# Patient Record
Sex: FEMALE | Race: WHITE | Employment: FULL TIME | URBAN - METROPOLITAN AREA
[De-identification: names, ages, dates, MRNs, and addresses within clinical notes are randomized per-mention and may not be internally consistent; named-entity substitution may affect disease eponyms.]

---

## 2017-12-19 ENCOUNTER — OFFICE VISIT (OUTPATIENT)
Dept: ORTHOPEDIC SURGERY | Age: 52
End: 2017-12-19

## 2017-12-19 VITALS — WEIGHT: 210 LBS | BODY MASS INDEX: 37.21 KG/M2 | RESPIRATION RATE: 16 BRPM | HEIGHT: 63 IN

## 2017-12-19 DIAGNOSIS — S63.501A WRIST SPRAIN, RIGHT, INITIAL ENCOUNTER: Primary | ICD-10-CM

## 2017-12-19 PROCEDURE — 99203 OFFICE O/P NEW LOW 30 MIN: CPT | Performed by: PHYSICIAN ASSISTANT

## 2017-12-19 PROCEDURE — 3014F SCREEN MAMMO DOC REV: CPT | Performed by: PHYSICIAN ASSISTANT

## 2017-12-19 PROCEDURE — G8417 CALC BMI ABV UP PARAM F/U: HCPCS | Performed by: PHYSICIAN ASSISTANT

## 2017-12-19 PROCEDURE — 4004F PT TOBACCO SCREEN RCVD TLK: CPT | Performed by: PHYSICIAN ASSISTANT

## 2017-12-19 PROCEDURE — 3017F COLORECTAL CA SCREEN DOC REV: CPT | Performed by: PHYSICIAN ASSISTANT

## 2017-12-19 PROCEDURE — 73110 X-RAY EXAM OF WRIST: CPT | Performed by: PHYSICIAN ASSISTANT

## 2017-12-19 PROCEDURE — G8427 DOCREV CUR MEDS BY ELIG CLIN: HCPCS | Performed by: PHYSICIAN ASSISTANT

## 2017-12-19 PROCEDURE — G8484 FLU IMMUNIZE NO ADMIN: HCPCS | Performed by: PHYSICIAN ASSISTANT

## 2017-12-19 ASSESSMENT — ENCOUNTER SYMPTOMS
EYES NEGATIVE: 1
GASTROINTESTINAL NEGATIVE: 1
RESPIRATORY NEGATIVE: 1

## 2019-04-10 ENCOUNTER — HOSPITAL ENCOUNTER (OUTPATIENT)
Age: 54
Setting detail: OBSERVATION
Discharge: HOME | End: 2019-04-13
Attending: EMERGENCY MEDICINE | Admitting: SURGERY
Payer: COMMERCIAL

## 2019-04-10 ENCOUNTER — PREP FOR PROCEDURE (OUTPATIENT)
Dept: GENERAL RADIOLOGY | Age: 54
End: 2019-04-10

## 2019-04-10 ENCOUNTER — ANESTHESIA EVENT (OUTPATIENT)
Dept: OPERATING ROOM | Age: 54
End: 2019-04-10
Payer: COMMERCIAL

## 2019-04-10 ENCOUNTER — APPOINTMENT (OUTPATIENT)
Dept: ULTRASOUND IMAGING | Age: 54
End: 2019-04-10
Payer: COMMERCIAL

## 2019-04-10 ENCOUNTER — APPOINTMENT (OUTPATIENT)
Dept: CT IMAGING | Age: 54
End: 2019-04-10
Payer: COMMERCIAL

## 2019-04-10 DIAGNOSIS — K81.9 CHOLECYSTITIS: Primary | ICD-10-CM

## 2019-04-10 DIAGNOSIS — R91.1 PULMONARY NODULE: ICD-10-CM

## 2019-04-10 DIAGNOSIS — K81.0 ACUTE CHOLECYSTITIS: ICD-10-CM

## 2019-04-10 DIAGNOSIS — R11.2 NAUSEA AND VOMITING, INTRACTABILITY OF VOMITING NOT SPECIFIED, UNSPECIFIED VOMITING TYPE: ICD-10-CM

## 2019-04-10 DIAGNOSIS — R10.9 ABDOMINAL PAIN, UNSPECIFIED ABDOMINAL LOCATION: ICD-10-CM

## 2019-04-10 DIAGNOSIS — K59.00 CONSTIPATION, UNSPECIFIED CONSTIPATION TYPE: ICD-10-CM

## 2019-04-10 LAB
ALBUMIN SERPL-MCNC: 3.7 GM/DL (ref 3.4–5)
ALP BLD-CCNC: 51 IU/L (ref 40–129)
ALT SERPL-CCNC: 11 U/L (ref 10–40)
ANION GAP SERPL CALCULATED.3IONS-SCNC: 9 MMOL/L (ref 4–16)
AST SERPL-CCNC: 12 IU/L (ref 15–37)
BACTERIA: NEGATIVE /HPF
BASOPHILS ABSOLUTE: 0.1 K/CU MM
BASOPHILS RELATIVE PERCENT: 0.4 % (ref 0–1)
BILIRUB SERPL-MCNC: 0.6 MG/DL (ref 0–1)
BILIRUBIN URINE: NEGATIVE MG/DL
BLOOD, URINE: ABNORMAL
BUN BLDV-MCNC: 8 MG/DL (ref 6–23)
CALCIUM SERPL-MCNC: 8.6 MG/DL (ref 8.3–10.6)
CHLORIDE BLD-SCNC: 96 MMOL/L (ref 99–110)
CLARITY: CLEAR
CO2: 27 MMOL/L (ref 21–32)
COLOR: YELLOW
CREAT SERPL-MCNC: 0.7 MG/DL (ref 0.6–1.1)
DIFFERENTIAL TYPE: ABNORMAL
EOSINOPHILS ABSOLUTE: 0.1 K/CU MM
EOSINOPHILS RELATIVE PERCENT: 0.4 % (ref 0–3)
GFR AFRICAN AMERICAN: >60 ML/MIN/1.73M2
GFR NON-AFRICAN AMERICAN: >60 ML/MIN/1.73M2
GLUCOSE BLD-MCNC: 107 MG/DL (ref 70–99)
GLUCOSE, URINE: NEGATIVE MG/DL
GONADOTROPIN, CHORIONIC (HCG) QUANT: NORMAL UIU/ML
HCT VFR BLD CALC: 43.5 % (ref 37–47)
HEMOGLOBIN: 13 GM/DL (ref 12.5–16)
IMMATURE NEUTROPHIL %: 0.4 % (ref 0–0.43)
KETONES, URINE: NEGATIVE MG/DL
LEUKOCYTE ESTERASE, URINE: NEGATIVE
LIPASE: 16 IU/L (ref 13–60)
LYMPHOCYTES ABSOLUTE: 1.9 K/CU MM
LYMPHOCYTES RELATIVE PERCENT: 13.8 % (ref 24–44)
MCH RBC QN AUTO: 26.9 PG (ref 27–31)
MCHC RBC AUTO-ENTMCNC: 29.9 % (ref 32–36)
MCV RBC AUTO: 89.9 FL (ref 78–100)
MONOCYTES ABSOLUTE: 1.3 K/CU MM
MONOCYTES RELATIVE PERCENT: 9.4 % (ref 0–4)
MUCUS: ABNORMAL HPF
NITRITE URINE, QUANTITATIVE: NEGATIVE
PDW BLD-RTO: 13 % (ref 11.7–14.9)
PH, URINE: 6 (ref 5–8)
PLATELET # BLD: 176 K/CU MM (ref 140–440)
PMV BLD AUTO: 12.4 FL (ref 7.5–11.1)
POTASSIUM SERPL-SCNC: 3.7 MMOL/L (ref 3.5–5.1)
PROTEIN UA: NEGATIVE MG/DL
RBC # BLD: 4.84 M/CU MM (ref 4.2–5.4)
RBC URINE: 1 /HPF (ref 0–6)
SEGMENTED NEUTROPHILS ABSOLUTE COUNT: 10.3 K/CU MM
SEGMENTED NEUTROPHILS RELATIVE PERCENT: 75.6 % (ref 36–66)
SODIUM BLD-SCNC: 132 MMOL/L (ref 135–145)
SPECIFIC GRAVITY UA: 1.01 (ref 1–1.03)
SQUAMOUS EPITHELIAL: 1 /HPF
TOTAL IMMATURE NEUTOROPHIL: 0.05 K/CU MM
TOTAL PROTEIN: 7 GM/DL (ref 6.4–8.2)
TRICHOMONAS: ABNORMAL /HPF
UROBILINOGEN, URINE: NORMAL MG/DL (ref 0.2–1)
WBC # BLD: 13.6 K/CU MM (ref 4–10.5)
WBC UA: 1 /HPF (ref 0–5)

## 2019-04-10 PROCEDURE — 80053 COMPREHEN METABOLIC PANEL: CPT

## 2019-04-10 PROCEDURE — 96376 TX/PRO/DX INJ SAME DRUG ADON: CPT

## 2019-04-10 PROCEDURE — 6370000000 HC RX 637 (ALT 250 FOR IP): Performed by: SURGERY

## 2019-04-10 PROCEDURE — 74177 CT ABD & PELVIS W/CONTRAST: CPT

## 2019-04-10 PROCEDURE — G0378 HOSPITAL OBSERVATION PER HR: HCPCS

## 2019-04-10 PROCEDURE — 2500000003 HC RX 250 WO HCPCS: Performed by: EMERGENCY MEDICINE

## 2019-04-10 PROCEDURE — 85025 COMPLETE CBC W/AUTO DIFF WBC: CPT

## 2019-04-10 PROCEDURE — 6360000002 HC RX W HCPCS: Performed by: EMERGENCY MEDICINE

## 2019-04-10 PROCEDURE — 84702 CHORIONIC GONADOTROPIN TEST: CPT

## 2019-04-10 PROCEDURE — 2580000003 HC RX 258: Performed by: EMERGENCY MEDICINE

## 2019-04-10 PROCEDURE — 96366 THER/PROPH/DIAG IV INF ADDON: CPT

## 2019-04-10 PROCEDURE — 99285 EMERGENCY DEPT VISIT HI MDM: CPT

## 2019-04-10 PROCEDURE — 81001 URINALYSIS AUTO W/SCOPE: CPT

## 2019-04-10 PROCEDURE — 96375 TX/PRO/DX INJ NEW DRUG ADDON: CPT

## 2019-04-10 PROCEDURE — 93010 ELECTROCARDIOGRAM REPORT: CPT | Performed by: INTERNAL MEDICINE

## 2019-04-10 PROCEDURE — 99219 PR INITIAL OBSERVATION CARE/DAY 50 MINUTES: CPT | Performed by: SURGERY

## 2019-04-10 PROCEDURE — 96372 THER/PROPH/DIAG INJ SC/IM: CPT

## 2019-04-10 PROCEDURE — 96365 THER/PROPH/DIAG IV INF INIT: CPT

## 2019-04-10 PROCEDURE — 93005 ELECTROCARDIOGRAM TRACING: CPT | Performed by: EMERGENCY MEDICINE

## 2019-04-10 PROCEDURE — 76705 ECHO EXAM OF ABDOMEN: CPT

## 2019-04-10 PROCEDURE — 96361 HYDRATE IV INFUSION ADD-ON: CPT

## 2019-04-10 PROCEDURE — 83690 ASSAY OF LIPASE: CPT

## 2019-04-10 PROCEDURE — 6360000002 HC RX W HCPCS: Performed by: SURGERY

## 2019-04-10 PROCEDURE — 6360000004 HC RX CONTRAST MEDICATION: Performed by: EMERGENCY MEDICINE

## 2019-04-10 PROCEDURE — 2500000003 HC RX 250 WO HCPCS: Performed by: SURGERY

## 2019-04-10 PROCEDURE — 2580000003 HC RX 258: Performed by: SURGERY

## 2019-04-10 RX ORDER — HYDROMORPHONE HCL 110MG/55ML
0.5 PATIENT CONTROLLED ANALGESIA SYRINGE INTRAVENOUS
Status: DISCONTINUED | OUTPATIENT
Start: 2019-04-10 | End: 2019-04-11

## 2019-04-10 RX ORDER — ONDANSETRON 2 MG/ML
4 INJECTION INTRAMUSCULAR; INTRAVENOUS EVERY 6 HOURS PRN
Status: DISCONTINUED | OUTPATIENT
Start: 2019-04-10 | End: 2019-04-13 | Stop reason: HOSPADM

## 2019-04-10 RX ORDER — MORPHINE SULFATE 4 MG/ML
4 INJECTION, SOLUTION INTRAMUSCULAR; INTRAVENOUS EVERY 30 MIN PRN
Status: DISCONTINUED | OUTPATIENT
Start: 2019-04-10 | End: 2019-04-10

## 2019-04-10 RX ORDER — HYDROCODONE BITARTRATE AND ACETAMINOPHEN 5; 325 MG/1; MG/1
2 TABLET ORAL EVERY 4 HOURS PRN
Status: DISCONTINUED | OUTPATIENT
Start: 2019-04-10 | End: 2019-04-13 | Stop reason: HOSPADM

## 2019-04-10 RX ORDER — SODIUM CHLORIDE 0.9 % (FLUSH) 0.9 %
10 SYRINGE (ML) INJECTION EVERY 12 HOURS SCHEDULED
Status: DISCONTINUED | OUTPATIENT
Start: 2019-04-10 | End: 2019-04-13 | Stop reason: HOSPADM

## 2019-04-10 RX ORDER — SODIUM CHLORIDE 0.9 % (FLUSH) 0.9 %
10 SYRINGE (ML) INJECTION 2 TIMES DAILY
Status: DISCONTINUED | OUTPATIENT
Start: 2019-04-10 | End: 2019-04-13 | Stop reason: HOSPADM

## 2019-04-10 RX ORDER — ASPIRIN 325 MG
325 TABLET ORAL DAILY
COMMUNITY

## 2019-04-10 RX ORDER — ONDANSETRON 2 MG/ML
4 INJECTION INTRAMUSCULAR; INTRAVENOUS EVERY 30 MIN PRN
Status: DISCONTINUED | OUTPATIENT
Start: 2019-04-10 | End: 2019-04-10 | Stop reason: HOSPADM

## 2019-04-10 RX ORDER — SODIUM CHLORIDE 0.9 % (FLUSH) 0.9 %
10 SYRINGE (ML) INJECTION PRN
Status: CANCELLED | OUTPATIENT
Start: 2019-04-10

## 2019-04-10 RX ORDER — DICYCLOMINE HYDROCHLORIDE 10 MG/ML
20 INJECTION INTRAMUSCULAR ONCE
Status: COMPLETED | OUTPATIENT
Start: 2019-04-10 | End: 2019-04-10

## 2019-04-10 RX ORDER — SODIUM CHLORIDE 0.9 % (FLUSH) 0.9 %
10 SYRINGE (ML) INJECTION PRN
Status: DISCONTINUED | OUTPATIENT
Start: 2019-04-10 | End: 2019-04-13 | Stop reason: HOSPADM

## 2019-04-10 RX ORDER — SODIUM CHLORIDE, SODIUM LACTATE, POTASSIUM CHLORIDE, CALCIUM CHLORIDE 600; 310; 30; 20 MG/100ML; MG/100ML; MG/100ML; MG/100ML
INJECTION, SOLUTION INTRAVENOUS CONTINUOUS
Status: DISCONTINUED | OUTPATIENT
Start: 2019-04-10 | End: 2019-04-13 | Stop reason: HOSPADM

## 2019-04-10 RX ORDER — HYDROCODONE BITARTRATE AND ACETAMINOPHEN 5; 325 MG/1; MG/1
1 TABLET ORAL EVERY 4 HOURS PRN
Status: DISCONTINUED | OUTPATIENT
Start: 2019-04-10 | End: 2019-04-13 | Stop reason: HOSPADM

## 2019-04-10 RX ORDER — SODIUM CHLORIDE 0.9 % (FLUSH) 0.9 %
10 SYRINGE (ML) INJECTION EVERY 12 HOURS SCHEDULED
Status: CANCELLED | OUTPATIENT
Start: 2019-04-10

## 2019-04-10 RX ORDER — HYDROMORPHONE HCL 110MG/55ML
0.25 PATIENT CONTROLLED ANALGESIA SYRINGE INTRAVENOUS
Status: DISCONTINUED | OUTPATIENT
Start: 2019-04-10 | End: 2019-04-11

## 2019-04-10 RX ORDER — 0.9 % SODIUM CHLORIDE 0.9 %
1000 INTRAVENOUS SOLUTION INTRAVENOUS ONCE
Status: COMPLETED | OUTPATIENT
Start: 2019-04-10 | End: 2019-04-10

## 2019-04-10 RX ORDER — ACETAMINOPHEN 325 MG/1
650 TABLET ORAL EVERY 4 HOURS PRN
Status: DISCONTINUED | OUTPATIENT
Start: 2019-04-10 | End: 2019-04-13 | Stop reason: HOSPADM

## 2019-04-10 RX ADMIN — HYDROCODONE BITARTRATE AND ACETAMINOPHEN 2 TABLET: 5; 325 TABLET ORAL at 18:43

## 2019-04-10 RX ADMIN — FAMOTIDINE 20 MG: 10 INJECTION, SOLUTION INTRAVENOUS at 14:30

## 2019-04-10 RX ADMIN — MORPHINE SULFATE 4 MG: 4 INJECTION INTRAVENOUS at 14:30

## 2019-04-10 RX ADMIN — HYDROMORPHONE HYDROCHLORIDE 0.5 MG: 2 INJECTION, SOLUTION INTRAMUSCULAR; INTRAVENOUS; SUBCUTANEOUS at 21:13

## 2019-04-10 RX ADMIN — TAZOBACTAM SODIUM AND PIPERACILLIN SODIUM 3.38 G: 375; 3 INJECTION, SOLUTION INTRAVENOUS at 23:38

## 2019-04-10 RX ADMIN — ONDANSETRON 4 MG: 2 INJECTION INTRAMUSCULAR; INTRAVENOUS at 14:30

## 2019-04-10 RX ADMIN — DICYCLOMINE HYDROCHLORIDE 20 MG: 20 INJECTION, SOLUTION INTRAMUSCULAR at 14:30

## 2019-04-10 RX ADMIN — SODIUM CHLORIDE, PRESERVATIVE FREE 10 ML: 5 INJECTION INTRAVENOUS at 17:07

## 2019-04-10 RX ADMIN — IOPAMIDOL 80 ML: 755 INJECTION, SOLUTION INTRAVENOUS at 17:06

## 2019-04-10 RX ADMIN — SODIUM CHLORIDE 1000 ML: 9 INJECTION, SOLUTION INTRAVENOUS at 14:30

## 2019-04-10 RX ADMIN — TAZOBACTAM SODIUM AND PIPERACILLIN SODIUM 3.38 G: 375; 3 INJECTION, SOLUTION INTRAVENOUS at 16:37

## 2019-04-10 RX ADMIN — SODIUM CHLORIDE, POTASSIUM CHLORIDE, SODIUM LACTATE AND CALCIUM CHLORIDE: 600; 310; 30; 20 INJECTION, SOLUTION INTRAVENOUS at 18:43

## 2019-04-10 RX ADMIN — HYDROCODONE BITARTRATE AND ACETAMINOPHEN 2 TABLET: 5; 325 TABLET ORAL at 23:46

## 2019-04-10 RX ADMIN — ONDANSETRON 4 MG: 2 INJECTION INTRAMUSCULAR; INTRAVENOUS at 22:10

## 2019-04-10 ASSESSMENT — ENCOUNTER SYMPTOMS
EYE REDNESS: 0
DIARRHEA: 0
EYE DISCHARGE: 0
CONSTIPATION: 1
ALLERGIC/IMMUNOLOGIC NEGATIVE: 1
RESPIRATORY NEGATIVE: 1
VOMITING: 0
NAUSEA: 1
SHORTNESS OF BREATH: 1
ABDOMINAL PAIN: 1
VOMITING: 1
EYES NEGATIVE: 1
ABDOMINAL DISTENTION: 0
CHEST TIGHTNESS: 0
SORE THROAT: 0
COLOR CHANGE: 0

## 2019-04-10 ASSESSMENT — PAIN DESCRIPTION - DESCRIPTORS
DESCRIPTORS: SHARP
DESCRIPTORS: SHOOTING
DESCRIPTORS: SHARP
DESCRIPTORS: SHARP

## 2019-04-10 ASSESSMENT — PAIN DESCRIPTION - ORIENTATION
ORIENTATION: RIGHT;MID
ORIENTATION: RIGHT;UPPER
ORIENTATION: RIGHT;MID
ORIENTATION: RIGHT;UPPER

## 2019-04-10 ASSESSMENT — PAIN DESCRIPTION - PAIN TYPE
TYPE: ACUTE PAIN

## 2019-04-10 ASSESSMENT — PAIN SCALES - GENERAL
PAINLEVEL_OUTOF10: 8
PAINLEVEL_OUTOF10: 10
PAINLEVEL_OUTOF10: 4
PAINLEVEL_OUTOF10: 7
PAINLEVEL_OUTOF10: 10
PAINLEVEL_OUTOF10: 6
PAINLEVEL_OUTOF10: 6

## 2019-04-10 ASSESSMENT — PAIN DESCRIPTION - LOCATION
LOCATION: ABDOMEN

## 2019-04-10 NOTE — H&P
GENERAL SURGERY INPATIENT HISTORY & PHYSICAL NOTE  [facility]    PATIENT: Dolores Lynne 1965, 46 y.o., female    MRN: [id number]    Physician: Yasmani Giang MD    Date: 4/10/19    Reason for Evaluation:  cholecystitis   Chief Complaint   Patient presents with    Abdominal Pain       Requesting Physician:  ED Consult - Dr. Betsey Crowe:     Chief Complaint   Patient presents with    Abdominal Pain       History Obtained From:  patient, electronic medical record    HISTORY OF PRESENT ILLNESS:      Tre Thibodeaux is a 46 y.o. female presenting with epigastric and right upper quadrant pain. It started on Sunday, but today became so severe that she called EMS and was brought to the ED. She has not had any episodes like this before. The pain does not radiate. She hasn't noticed an association with food. Denies aggravating or alleviating factors. She has had associated nausea, but no vomiting. She has had changes in the caliber and color of her stool over the last month or so - it has decreased in caliber and she had a few days about a month ago where it was black, but currently is more brown or yellow in color. She has been belching a lot, passing some flatus. Of Note: She had a heart cath in 2007 and had a femoral clot which embolized to her leg for which she was treated with tPA. She usually takes aspirin daily, but hasn't taken it in about a week.       Past Medical History:    Past Medical History:   Diagnosis Date    CAD (coronary artery disease)     Hx of blood clots 2007    clot to leg after a heart cath, treated with tPA       Past SurgicalHistory:    Past Surgical History:   Procedure Laterality Date    CARDIAC CATHETERIZATION  2007       CurrentMedications:   Current Facility-Administered Medications   Medication Dose Route Frequency Provider Last Rate Last Dose    morphine sulfate (PF) injection 4 mg  4 mg Intravenous Q30 Min PRN Nicolasa Colin DO place, and time. Skin: Skin is warm and dry. No rash noted. She is not diaphoretic. Psychiatric: She has a normal mood and affect. Her behavior is normal.       DATA:    Lab Results   Component Value Date    WBC 13.6 (H) 04/10/2019    HGB 13.0 04/10/2019    HCT 43.5 04/10/2019     04/10/2019     (L) 04/10/2019    K 3.7 04/10/2019    CL 96 (L) 04/10/2019    CO2 27 04/10/2019    BUN 8 04/10/2019    CREATININE 0.7 04/10/2019    GLUCOSE 107 (H) 04/10/2019    CALCIUM 8.6 04/10/2019    PROT 7.0 04/10/2019    BILITOT 0.6 04/10/2019    AST 12 (L) 04/10/2019    ALT 11 04/10/2019    ALKPHOS 51 04/10/2019    LIPASE 16 04/10/2019    INR 0.94 11/01/2010       Imaging:   Us Abdomen Limited    Result Date: 4/10/2019  EXAMINATION: RIGHT UPPER QUADRANT ULTRASOUND 4/10/2019 2:42 pm COMPARISON: 05/29/2000 HISTORY: ORDERING SYSTEM PROVIDED HISTORY: RUQ pain TECHNOLOGIST PROVIDED HISTORY: Reason for exam:->RUQ pain Ordering Physician Provided Reason for Exam: RUQ pain Acuity: Acute Type of Exam: Initial Additional signs and symptoms: nausea, constipation Relevant Medical/Surgical History: nk FINDINGS: LIVER:  Septated cyst measuring 5.3 cm in the dome of the liver. There also is a 2.2 cm cyst which is septated in the the right lobe of the liver. No solid masses. BILIARY SYSTEM:  Cholelithiasis. A stone is located in the neck of the gallbladder. The other stone is seen in the fundus. Mild gallbladder wall thickening. Positive sonographic Kenny's sign. Common bile duct is within normal limits measuring 6 mm. RIGHT KIDNEY: The right kidney is grossly unremarkable without evidence of hydronephrosis. PANCREAS:  Visualized portions of the pancreas are unremarkable. OTHER: No evidence of right upper quadrant ascites. 1. Cholelithiasis with mild gallbladder wall thickening, a stone in the gallbladder neck, and a positive sonographic Kenny's sign suggesting acute cholecystitis 2.  Septated cysts in the liver appear benign       Pertinent laboratory and imaging studies were personally reviewed if available. IMPRESSION:    Jacob Laura is a 46 y.o. female with abdominal pain and findings consistent with acute cholecystitis    1. Cholecystitis    2. Abdominal pain, unspecified abdominal location    3. Nausea and vomiting, intractability of vomiting not specified, unspecified vomiting type    4. Constipation, unspecified constipation type      There are no active problems to display for this patient. PLAN:  · Will admit for observation  · NPO after midnight  · Will start zosyn  · Check labs in AM  · Tentatively planning on OR tomorrow  Planned procedure: laparoscopic cholecystectomy  The risks, benefits, potential complications and possible alternatives of the procedure were discussed in detail, including complications of but not limited to infection, bleeding, anesthesia-related complications, death, bile duct injury or leak, or need for additional interventions. All questions were answered. The patient wished to proceed and consent was documented in the medical record. PAT anticipated: no (inpatient)  Informed consent: obtained  Anticipated status: 23 hour observation  Anticipated location:  [facility]   Will plan on outpatient follow up with GI for her change in stool caliber and color.     Electronically signed by Jen Piper MD, 4/10/2019, 4:49 PM

## 2019-04-10 NOTE — ED TRIAGE NOTES
Patient presents to ER with c/o right upper quadrant abdominal pain since Sunday. Patient states today pain became worse.

## 2019-04-10 NOTE — ED PROVIDER NOTES
[address] [facility]      TRIAGE CHIEF COMPLAINT:   Abdominal Pain      [place]:  Marycarmen Huizar is a 46 y.o. female that presents complaining of right upper quadrant epigastric pain nausea vomiting since Sunday. Constant upper quadrant pain epigastric pain. No history of abdominal surgeries denies any fevers chest pain short of breath cough she has urinary frequency otherwise denies complaints she has had constipation passing slight flatus. No other questions or concerns. REVIEW OF SYSTEMS:  At least 10 systems reviewed and otherwise acutely negative except as in the [address] Review of Systems   Constitutional: Negative. HENT: Negative. Eyes: Negative. Respiratory: Negative. Cardiovascular: Negative. Gastrointestinal: Positive for abdominal pain, constipation, nausea and vomiting. Endocrine: Negative. Genitourinary: Positive for frequency. Musculoskeletal: Negative. Skin: Negative. Allergic/Immunologic: Negative. Neurological: Negative. Hematological: Negative. Psychiatric/Behavioral: Negative. All other systems reviewed and are negative. Past Medical History:   Diagnosis Date    CAD (coronary artery disease)     Hx of blood clots 2007    clot to leg after a heart cath, treated with tPA     Past Surgical History:   Procedure Laterality Date    CARDIAC CATHETERIZATION  2007     History reviewed. No pertinent family history.   Social History     Socioeconomic History    Marital status:      Spouse name: Not on file    Number of children: Not on file    Years of education: Not on file    Highest education level: Not on file   Occupational History    Not on file   Social Needs    Financial resource strain: Not on file    Food insecurity:     Worry: Not on file     Inability: Not on file    Transportation needs:     Medical: Not on file     Non-medical: Not on file   Tobacco Use    Smoking status: Current Every Day Smoker     Packs/day: 0.50     Types: Cigarettes    Smokeless tobacco: Never Used   Substance and Sexual Activity    Alcohol use: No    Drug use: No    Sexual activity: Not on file   Lifestyle    Physical activity:     Days per week: Not on file     Minutes per session: Not on file    Stress: Not on file   Relationships    Social connections:     Talks on phone: Not on file     Gets together: Not on file     Attends Oriental orthodox service: Not on file     Active member of club or organization: Not on file     Attends meetings of clubs or organizations: Not on file     Relationship status: Not on file    Intimate partner violence:     Fear of current or ex partner: Not on file     Emotionally abused: Not on file     Physically abused: Not on file     Forced sexual activity: Not on file   Other Topics Concern    Not on file   Social History Narrative    Not on file     Current Facility-Administered Medications   Medication Dose Route Frequency Provider Last Rate Last Dose    sodium chloride flush 0.9 % injection 10 mL  10 mL Intravenous BID Samuel Hendrickson MD   10 mL at 04/10/19 1707    sodium chloride flush 0.9 % injection 10 mL  10 mL Intravenous 2 times per day Samuel Hendrickson MD        sodium chloride flush 0.9 % injection 10 mL  10 mL Intravenous PRN Samuel Hendrickson MD        acetaminophen (TYLENOL) tablet 650 mg  650 mg Oral Q4H PRN Samuel Hendrickson MD        lactated ringers infusion   Intravenous Continuous Samuel Hendrickson MD        HYDROcodone-acetaminophen (NORCO) 5-325 MG per tablet 1 tablet  1 tablet Oral Q4H PRN Samuel Hendrickson MD        Or    HYDROcodone-acetaminophen (NORCO) 5-325 MG per tablet 2 tablet  2 tablet Oral Q4H PRN Samuel Hendrickson MD        HYDROmorphone (DILAUDID) injection 0.25 mg  0.25 mg Intravenous Q3H PRN Samuel Hendrickson MD        Or   [facility] HYDROmorphone (DILAUDID) injection 0.5 mg  0.5 mg Intravenous Q3H PRN Samuel Hendrickson MD        ondansetron [facility]) injection 4 mg  4 mg Intravenous Q6H PRN Max Graham MD        [START ON 4/11/2019] piperacillin-tazobactam (ZOSYN) 3.375 g in dextrose 50 mL IVPB extended infusion (premix)  3.375 g Intravenous Q8H Max Graham MD          No Known Allergies  Current Facility-Administered Medications   Medication Dose Route Frequency Provider Last Rate Last Dose    sodium chloride flush 0.9 % injection 10 mL  10 mL Intravenous BID Max Graham MD   10 mL at 04/10/19 1707    sodium chloride flush 0.9 % injection 10 mL  10 mL Intravenous 2 times per day Max Graham MD        sodium chloride flush 0.9 % injection 10 mL  10 mL Intravenous PRN Max Graham MD        acetaminophen (TYLENOL) tablet 650 mg  650 mg Oral Q4H PRN Max Graham MD        lactated ringers infusion   Intravenous Continuous Max Graham MD        HYDROcodone-acetaminophen [facility]) 5-325 MG per tablet 1 tablet  1 tablet Oral Q4H PRN Max Graham MD        Or   [facility] HYDROcodone-acetaminophen [facility]) 5-325 MG per tablet 2 tablet  2 tablet Oral Q4H PRN Max Graham MD        HYDROmorphone (DILAUDID) injection 0.25 mg  0.25 mg Intravenous Q3H PRN Max Graham MD        Or   [facility] HYDROmorphone (DILAUDID) injection 0.5 mg  0.5 mg Intravenous Q3H PRN Max Graham MD        ondansetron [facility]) injection 4 mg  4 mg Intravenous Q6H PRN Max Graham MD        [START ON 4/11/2019] piperacillin-tazobactam (ZOSYN) 3.375 g in dextrose 50 mL IVPB extended infusion (premix)  3.375 g Intravenous Zamzam Souza MD           Nursing Notes Reviewed    VITAL SIGNS:  ED Triage Vitals [04/10/19 1413]   Enc Vitals Group      BP (!) 164/105      Pulse 96      Resp 18      Temp 98.4 °F (36.9 °C)      Temp Source Oral      SpO2 99 %      Weight 200 lb (90.7 kg)      Height 5' 3\" (1.6 m)      Head Circumference       Peak Flow       Pain Score       Pain Loc       Pain Edu? Excl. in [address]? PHYSICAL EXAM:  Physical Exam   Constitutional: She is oriented to person, place, and time. She appears well-developed and well-nourished. She is active and cooperative. Non-toxic appearance. She does not have a sickly appearance. She appears ill. No distress. HENT:   Head: Normocephalic and atraumatic. Right Ear: External ear normal.   Left Ear: External ear normal.   Mouth/Throat: Oropharynx is clear and moist.   Eyes: Pupils are equal, round, and reactive to light. Conjunctivae and EOM are normal. Right eye exhibits no discharge. Left eye exhibits no discharge. No scleral icterus. Neck: Normal range of motion. No JVD present. Cardiovascular: Normal rate, regular rhythm, normal heart sounds and intact distal pulses. Exam reveals no gallop and no friction rub. No murmur heard. Pulmonary/Chest: Effort normal and breath sounds normal. No stridor. No respiratory distress. She has no wheezes. She has no rales. Abdominal: Soft. Bowel sounds are normal. She exhibits no distension and no mass. There is tenderness in the right upper quadrant and epigastric area. There is positive Kenny's sign. There is no rigidity, no rebound, no guarding and no tenderness at McBurney's point. Musculoskeletal: Normal range of motion. She exhibits no edema, tenderness or deformity. Neurological: She is alert and oriented to person, place, and time. She has normal strength. She is not disoriented. She displays no atrophy and no tremor. No cranial nerve deficit or sensory deficit. She exhibits normal muscle tone. She displays no seizure activity. Coordination normal. GCS eye subscore is 4. GCS verbal subscore is 5. GCS motor subscore is 6. Skin: Skin is warm. No rash noted. She is not diaphoretic. No erythema. No pallor. Psychiatric: She has a normal mood and affect. Her behavior is normal. Judgment and thought content normal.   Nursing note and vitals reviewed.       I have reviewed andinterpreted all of the currently available lab results from this visit (if applicable):    Results for orders placed or performed during the hospital encounter of 04/10/19   CBC Auto Differential   Result Value Ref Range    WBC 13.6 (H) 4.0 - 10.5 K/CU MM    RBC 4.84 4.2 - 5.4 M/CU MM    Hemoglobin 13.0 12.5 - 16.0 GM/DL    Hematocrit 43.5 37 - 47 %    MCV 89.9 78 - 100 FL    MCH 26.9 (L) 27 - 31 PG    MCHC 29.9 (L) 32.0 - 36.0 %    RDW 13.0 11.7 - 14.9 %    Platelets 175 085 - 175 K/CU MM    MPV 12.4 (H) 7.5 - 11.1 FL    Immature Neutrophil % 0.4 0 - 0.43 %    Segs Relative 75.6 (H) 36 - 66 %    Eosinophils % 0.4 0 - 3 %    Basophils % 0.4 0 - 1 %    Lymphocytes % 13.8 (L) 24 - 44 %    Monocytes % 9.4 (H) 0 - 4 %    Total Immature Neutrophil 0.05 K/CU MM    Segs Absolute 10.3 K/CU MM    Eosinophils # 0.1 K/CU MM    Basophils # 0.1 K/CU MM    Lymphocytes # 1.9 K/CU MM    Monocytes # 1.3 K/CU MM    Differential Type AUTOMATED DIFFERENTIAL    CMP   Result Value Ref Range    Sodium 132 (L) 135 - 145 MMOL/L    Potassium 3.7 3.5 - 5.1 MMOL/L    Chloride 96 (L) 99 - 110 mMol/L    CO2 27 21 - 32 MMOL/L    BUN 8 6 - 23 MG/DL    CREATININE 0.7 0.6 - 1.1 MG/DL    Glucose 107 (H) 70 - 99 MG/DL    Calcium 8.6 8.3 - 10.6 MG/DL    Alb 3.7 3.4 - 5.0 GM/DL    Total Protein 7.0 6.4 - 8.2 GM/DL    Total Bilirubin 0.6 0.0 - 1.0 MG/DL    ALT 11 10 - 40 U/L    AST 12 (L) 15 - 37 IU/L    Alkaline Phosphatase 51 40 - 129 IU/L    GFR Non-African American >60 >60 mL/min/1.73m2    GFR African American >60 >60 mL/min/1.73m2    Anion Gap 9 4 - 16   Lipase   Result Value Ref Range    Lipase 16 13 - 60 IU/L   Urinalysis   Result Value Ref Range    Color, UA YELLOW YELLOW    Clarity, UA CLEAR CLEAR    Glucose, Urine NEGATIVE NEGATIVE MG/DL    Bilirubin Urine NEGATIVE NEGATIVE MG/DL    Ketones, Urine NEGATIVE NEGATIVE MG/DL    Specific Gravity, UA 1.012 1.001 - 1.035    Blood, Urine SMALL (A) NEGATIVE    pH, Urine 6.0 5.0 - 8.0    Protein, UA NEGATIVE NEGATIVE MG/DL    Urobilinogen, Urine NORMAL 0.2 - 1.0 MG/DL    Nitrite Urine, Quantitative NEGATIVE NEGATIVE    Leukocyte Esterase, Urine NEGATIVE NEGATIVE    RBC, UA 1 0 - 6 /HPF    WBC, UA 1 0 - 5 /HPF    Bacteria, UA NEGATIVE NEGATIVE /HPF    Squam Epithel, UA 1 /HPF    Mucus, UA RARE (A) NEGATIVE HPF    Trichomonas, UA NONE SEEN NONE SEEN /HPF   HCG, Quantitative, Pregnancy   Result Value Ref Range    hCG Quant 1.9                                          UIU/ML    hCG Quant EXPECTED VALUES IN PREGNANCY UIU/ML    hCG Quant    7-50               0.2-1 WEEK UIU/ML    hCG Quant                 1-2 WEEKS UIU/ML    hCG Quant    100-5000           2-3 WEEKS UIU/ML    hCG Quant    500-10,000         3-4 WEEKS UIU/ML    hCG Quant    1000-50,000        4-5 WEEKS UIU/ML    hCG Quant    10,000-100,000     5-6 WEEKS UIU/ML    hCG Quant    15,000-200,000     6-8 WEEKS UIU/ML    hCG Quant    10,000-100,000     2-3 MONTHS UIU/ML   EKG 12 Lead   Result Value Ref Range    Ventricular Rate 86 BPM    Atrial Rate 86 BPM    P-R Interval 182 ms    QRS Duration 88 ms    Q-T Interval 366 ms    QTc Calculation (Bazett) 437 ms    P Axis 34 degrees    R Axis -9 degrees    T Axis 33 degrees    Diagnosis       Normal sinus rhythm  Normal ECG  No previous ECGs available  Confirmed by [facility] Tamar HERNÁNDEZ ([id number]) on 4/10/2019 5:47:07 PM          Radiographs (if obtained):  [] The following radiograph was interpreted by myself in the absence of a radiologist:  [x] Radiologist's Report Reviewed:    RUQ US, CT Abd Pelv    EKG (if obtained): (All EKG's are interpreted by myself in the absence of a cardiologist)    12 lead EKG per my interpretation:  Normal Sinus Rhythm 86  Axis is   Normal  QTc is  437  There is no specific T wave changes appreciated. There is no specific ST wave changes appreciated. Prior EKG to compare with was not available     Total critical care time today provided was at least 30 minutes.  This excludes seperately billable procedure. Critical care time provided for reviewing labs, images consulting surgery giving antibiotics,  that required close evaluation and/or intervention with concern for patient decompensation. MDM:    Patient here for quadrant epigastric pain nausea vomiting constipation since Sunday. She appears in no distress she is in some pain she had a Kenny's sign will give pain medicine nausea medicine IV fluids we'll check labs and imaging including ultrasound EKG. ultrasound shows cholecystitis has no white count I did talk to general surgery admitted for observation given antibiotics pain medicine nausea medicine. Patient feels better repeat evaluation.     CLINICAL IMPRESSION:  Final diagnoses:   Abdominal pain, unspecified abdominal location   Nausea and vomiting, intractability of vomiting not specified, unspecified vomiting type   Constipation, unspecified constipation type   Cholecystitis   Pulmonary nodule       (Please note that portions of this note may have been completed with a voice recognition program. Efforts were made to edit the dictations but occasionally words aremis-transcribed.)    DISPOSITION REFERRAL (if applicable):  Alicia Wilcox MD  [address] [facility] [address] [id number] [phone]            DISPOSITION MEDICATIONS (if applicable):  Current Discharge Medication List             Vinicius Yuan, [facility],   04/10/19 3916

## 2019-04-10 NOTE — ED NOTES
Report called to [address] at this time.  Bed 4122 dirty per Elsa ed [address] [facility] FRANCISCO Campbell  04/10/19 2934

## 2019-04-10 NOTE — ED NOTES
Bed: H-02  Expected date:   Expected time:   Means of arrival:   Comments:  Mckayla Anthony RN  04/10/19 3089

## 2019-04-10 NOTE — ED NOTES
Bed: ED-14  Expected date:   Expected time:   Means of arrival:   Comments:  [address], RN  04/10/19 9713

## 2019-04-11 ENCOUNTER — ANESTHESIA (OUTPATIENT)
Dept: OPERATING ROOM | Age: 54
End: 2019-04-11
Payer: COMMERCIAL

## 2019-04-11 VITALS
OXYGEN SATURATION: 98 % | TEMPERATURE: 97.8 F | DIASTOLIC BLOOD PRESSURE: 95 MMHG | SYSTOLIC BLOOD PRESSURE: 177 MMHG | RESPIRATION RATE: 12 BRPM

## 2019-04-11 LAB
ANION GAP SERPL CALCULATED.3IONS-SCNC: 8 MMOL/L (ref 4–16)
BUN BLDV-MCNC: 8 MG/DL (ref 6–23)
CALCIUM SERPL-MCNC: 8.5 MG/DL (ref 8.3–10.6)
CHLORIDE BLD-SCNC: 99 MMOL/L (ref 99–110)
CO2: 31 MMOL/L (ref 21–32)
CREAT SERPL-MCNC: 0.8 MG/DL (ref 0.6–1.1)
GFR AFRICAN AMERICAN: >60 ML/MIN/1.73M2
GFR NON-AFRICAN AMERICAN: >60 ML/MIN/1.73M2
GLUCOSE BLD-MCNC: 99 MG/DL (ref 70–99)
HCT VFR BLD CALC: 41.5 % (ref 37–47)
HEMOGLOBIN: 12.5 GM/DL (ref 12.5–16)
MCH RBC QN AUTO: 27.3 PG (ref 27–31)
MCHC RBC AUTO-ENTMCNC: 30.1 % (ref 32–36)
MCV RBC AUTO: 90.6 FL (ref 78–100)
PDW BLD-RTO: 13.2 % (ref 11.7–14.9)
PLATELET # BLD: 170 K/CU MM (ref 140–440)
PMV BLD AUTO: 12.3 FL (ref 7.5–11.1)
POTASSIUM SERPL-SCNC: 4.1 MMOL/L (ref 3.5–5.1)
RBC # BLD: 4.58 M/CU MM (ref 4.2–5.4)
SODIUM BLD-SCNC: 138 MMOL/L (ref 135–145)
WBC # BLD: 9.5 K/CU MM (ref 4–10.5)

## 2019-04-11 PROCEDURE — G0378 HOSPITAL OBSERVATION PER HR: HCPCS

## 2019-04-11 PROCEDURE — 2500000003 HC RX 250 WO HCPCS: Performed by: SURGERY

## 2019-04-11 PROCEDURE — 3700000000 HC ANESTHESIA ATTENDED CARE: Performed by: SURGERY

## 2019-04-11 PROCEDURE — 47562 LAPAROSCOPIC CHOLECYSTECTOMY: CPT | Performed by: SURGERY

## 2019-04-11 PROCEDURE — 96376 TX/PRO/DX INJ SAME DRUG ADON: CPT

## 2019-04-11 PROCEDURE — 3600000014 HC SURGERY LEVEL 4 ADDTL 15MIN: Performed by: SURGERY

## 2019-04-11 PROCEDURE — 2720000010 HC SURG SUPPLY STERILE: Performed by: SURGERY

## 2019-04-11 PROCEDURE — 6360000002 HC RX W HCPCS: Performed by: NURSE ANESTHETIST, CERTIFIED REGISTERED

## 2019-04-11 PROCEDURE — 6360000002 HC RX W HCPCS: Performed by: ANESTHESIOLOGY

## 2019-04-11 PROCEDURE — 3600000004 HC SURGERY LEVEL 4 BASE: Performed by: SURGERY

## 2019-04-11 PROCEDURE — 2709999900 HC NON-CHARGEABLE SUPPLY: Performed by: SURGERY

## 2019-04-11 PROCEDURE — 36415 COLL VENOUS BLD VENIPUNCTURE: CPT

## 2019-04-11 PROCEDURE — 2580000003 HC RX 258: Performed by: NURSE ANESTHETIST, CERTIFIED REGISTERED

## 2019-04-11 PROCEDURE — 7100000001 HC PACU RECOVERY - ADDTL 15 MIN: Performed by: SURGERY

## 2019-04-11 PROCEDURE — 88304 TISSUE EXAM BY PATHOLOGIST: CPT

## 2019-04-11 PROCEDURE — 7100000000 HC PACU RECOVERY - FIRST 15 MIN: Performed by: SURGERY

## 2019-04-11 PROCEDURE — 85027 COMPLETE CBC AUTOMATED: CPT

## 2019-04-11 PROCEDURE — 80048 BASIC METABOLIC PNL TOTAL CA: CPT

## 2019-04-11 PROCEDURE — 2580000003 HC RX 258: Performed by: SURGERY

## 2019-04-11 PROCEDURE — 96366 THER/PROPH/DIAG IV INF ADDON: CPT

## 2019-04-11 PROCEDURE — 6370000000 HC RX 637 (ALT 250 FOR IP): Performed by: SURGERY

## 2019-04-11 PROCEDURE — 2500000003 HC RX 250 WO HCPCS: Performed by: NURSE ANESTHETIST, CERTIFIED REGISTERED

## 2019-04-11 PROCEDURE — 3700000001 HC ADD 15 MINUTES (ANESTHESIA): Performed by: SURGERY

## 2019-04-11 PROCEDURE — 6360000002 HC RX W HCPCS: Performed by: SURGERY

## 2019-04-11 RX ORDER — HYDRALAZINE HYDROCHLORIDE 20 MG/ML
5 INJECTION INTRAMUSCULAR; INTRAVENOUS EVERY 10 MIN PRN
Status: DISCONTINUED | OUTPATIENT
Start: 2019-04-11 | End: 2019-04-11 | Stop reason: HOSPADM

## 2019-04-11 RX ORDER — BUPIVACAINE HYDROCHLORIDE 5 MG/ML
INJECTION, SOLUTION EPIDURAL; INTRACAUDAL
Status: COMPLETED | OUTPATIENT
Start: 2019-04-11 | End: 2019-04-11

## 2019-04-11 RX ORDER — SODIUM CHLORIDE 0.9 % (FLUSH) 0.9 %
10 SYRINGE (ML) INJECTION EVERY 12 HOURS SCHEDULED
Status: DISCONTINUED | OUTPATIENT
Start: 2019-04-11 | End: 2019-04-13 | Stop reason: HOSPADM

## 2019-04-11 RX ORDER — LABETALOL HYDROCHLORIDE 5 MG/ML
5 INJECTION, SOLUTION INTRAVENOUS EVERY 10 MIN PRN
Status: DISCONTINUED | OUTPATIENT
Start: 2019-04-11 | End: 2019-04-11 | Stop reason: HOSPADM

## 2019-04-11 RX ORDER — FENTANYL CITRATE 50 UG/ML
INJECTION, SOLUTION INTRAMUSCULAR; INTRAVENOUS PRN
Status: DISCONTINUED | OUTPATIENT
Start: 2019-04-11 | End: 2019-04-11 | Stop reason: SDUPTHER

## 2019-04-11 RX ORDER — DEXAMETHASONE SODIUM PHOSPHATE 4 MG/ML
INJECTION, SOLUTION INTRA-ARTICULAR; INTRALESIONAL; INTRAMUSCULAR; INTRAVENOUS; SOFT TISSUE PRN
Status: DISCONTINUED | OUTPATIENT
Start: 2019-04-11 | End: 2019-04-11 | Stop reason: SDUPTHER

## 2019-04-11 RX ORDER — MORPHINE SULFATE 4 MG/ML
2 INJECTION, SOLUTION INTRAMUSCULAR; INTRAVENOUS
Status: DISCONTINUED | OUTPATIENT
Start: 2019-04-11 | End: 2019-04-13 | Stop reason: HOSPADM

## 2019-04-11 RX ORDER — ACETAMINOPHEN 10 MG/ML
1000 INJECTION, SOLUTION INTRAVENOUS ONCE
Status: COMPLETED | OUTPATIENT
Start: 2019-04-11 | End: 2019-04-11

## 2019-04-11 RX ORDER — ONDANSETRON 2 MG/ML
INJECTION INTRAMUSCULAR; INTRAVENOUS PRN
Status: DISCONTINUED | OUTPATIENT
Start: 2019-04-11 | End: 2019-04-11 | Stop reason: SDUPTHER

## 2019-04-11 RX ORDER — ONDANSETRON 2 MG/ML
4 INJECTION INTRAMUSCULAR; INTRAVENOUS
Status: DISCONTINUED | OUTPATIENT
Start: 2019-04-11 | End: 2019-04-11 | Stop reason: HOSPADM

## 2019-04-11 RX ORDER — ROCURONIUM BROMIDE 10 MG/ML
INJECTION, SOLUTION INTRAVENOUS PRN
Status: DISCONTINUED | OUTPATIENT
Start: 2019-04-11 | End: 2019-04-11 | Stop reason: SDUPTHER

## 2019-04-11 RX ORDER — DOCUSATE SODIUM 100 MG/1
100 CAPSULE, LIQUID FILLED ORAL 2 TIMES DAILY
Status: DISCONTINUED | OUTPATIENT
Start: 2019-04-11 | End: 2019-04-13 | Stop reason: HOSPADM

## 2019-04-11 RX ORDER — HYDROMORPHONE HCL 110MG/55ML
0.5 PATIENT CONTROLLED ANALGESIA SYRINGE INTRAVENOUS EVERY 5 MIN PRN
Status: DISCONTINUED | OUTPATIENT
Start: 2019-04-11 | End: 2019-04-11 | Stop reason: HOSPADM

## 2019-04-11 RX ORDER — MORPHINE SULFATE 4 MG/ML
4 INJECTION, SOLUTION INTRAMUSCULAR; INTRAVENOUS
Status: DISCONTINUED | OUTPATIENT
Start: 2019-04-11 | End: 2019-04-13 | Stop reason: HOSPADM

## 2019-04-11 RX ORDER — SODIUM CHLORIDE, SODIUM LACTATE, POTASSIUM CHLORIDE, CALCIUM CHLORIDE 600; 310; 30; 20 MG/100ML; MG/100ML; MG/100ML; MG/100ML
INJECTION, SOLUTION INTRAVENOUS CONTINUOUS PRN
Status: DISCONTINUED | OUTPATIENT
Start: 2019-04-11 | End: 2019-04-11 | Stop reason: SDUPTHER

## 2019-04-11 RX ORDER — PROPOFOL 10 MG/ML
INJECTION, EMULSION INTRAVENOUS PRN
Status: DISCONTINUED | OUTPATIENT
Start: 2019-04-11 | End: 2019-04-11 | Stop reason: SDUPTHER

## 2019-04-11 RX ORDER — SODIUM CHLORIDE 0.9 % (FLUSH) 0.9 %
10 SYRINGE (ML) INJECTION PRN
Status: DISCONTINUED | OUTPATIENT
Start: 2019-04-11 | End: 2019-04-13 | Stop reason: HOSPADM

## 2019-04-11 RX ORDER — FENTANYL CITRATE 50 UG/ML
25 INJECTION, SOLUTION INTRAMUSCULAR; INTRAVENOUS EVERY 5 MIN PRN
Status: DISCONTINUED | OUTPATIENT
Start: 2019-04-11 | End: 2019-04-11 | Stop reason: HOSPADM

## 2019-04-11 RX ADMIN — PROPOFOL 200 MG: 10 INJECTION, EMULSION INTRAVENOUS at 15:27

## 2019-04-11 RX ADMIN — ROCURONIUM BROMIDE 20 MG: 50 INJECTION, SOLUTION INTRAVENOUS at 15:40

## 2019-04-11 RX ADMIN — SODIUM CHLORIDE, POTASSIUM CHLORIDE, SODIUM LACTATE AND CALCIUM CHLORIDE: 600; 310; 30; 20 INJECTION, SOLUTION INTRAVENOUS at 23:19

## 2019-04-11 RX ADMIN — SODIUM CHLORIDE, POTASSIUM CHLORIDE, SODIUM LACTATE AND CALCIUM CHLORIDE: 600; 310; 30; 20 INJECTION, SOLUTION INTRAVENOUS at 16:18

## 2019-04-11 RX ADMIN — SODIUM CHLORIDE, POTASSIUM CHLORIDE, SODIUM LACTATE AND CALCIUM CHLORIDE: 600; 310; 30; 20 INJECTION, SOLUTION INTRAVENOUS at 05:50

## 2019-04-11 RX ADMIN — ACETAMINOPHEN 1000 MG: 10 INJECTION, SOLUTION INTRAVENOUS at 17:30

## 2019-04-11 RX ADMIN — SODIUM CHLORIDE, POTASSIUM CHLORIDE, SODIUM LACTATE AND CALCIUM CHLORIDE: 600; 310; 30; 20 INJECTION, SOLUTION INTRAVENOUS at 15:22

## 2019-04-11 RX ADMIN — SUGAMMADEX 200 MG: 100 INJECTION, SOLUTION INTRAVENOUS at 16:47

## 2019-04-11 RX ADMIN — HYDROMORPHONE HYDROCHLORIDE 0.5 MG: 2 INJECTION, SOLUTION INTRAMUSCULAR; INTRAVENOUS; SUBCUTANEOUS at 17:09

## 2019-04-11 RX ADMIN — MORPHINE SULFATE 4 MG: 4 INJECTION INTRAVENOUS at 18:22

## 2019-04-11 RX ADMIN — ONDANSETRON 4 MG: 2 INJECTION INTRAMUSCULAR; INTRAVENOUS at 15:33

## 2019-04-11 RX ADMIN — SODIUM CHLORIDE, PRESERVATIVE FREE 10 ML: 5 INJECTION INTRAVENOUS at 10:27

## 2019-04-11 RX ADMIN — FENTANYL CITRATE 100 MCG: 50 INJECTION INTRAMUSCULAR; INTRAVENOUS at 15:27

## 2019-04-11 RX ADMIN — TAZOBACTAM SODIUM AND PIPERACILLIN SODIUM 3.38 G: 375; 3 INJECTION, SOLUTION INTRAVENOUS at 18:22

## 2019-04-11 RX ADMIN — LIDOCAINE HYDROCHLORIDE 80 MG: 20 INJECTION, SOLUTION INTRAVENOUS at 15:27

## 2019-04-11 RX ADMIN — HYDROMORPHONE HYDROCHLORIDE 0.5 MG: 2 INJECTION, SOLUTION INTRAMUSCULAR; INTRAVENOUS; SUBCUTANEOUS at 17:23

## 2019-04-11 RX ADMIN — HYDROCODONE BITARTRATE AND ACETAMINOPHEN 2 TABLET: 5; 325 TABLET ORAL at 23:19

## 2019-04-11 RX ADMIN — ROCURONIUM BROMIDE 30 MG: 50 INJECTION, SOLUTION INTRAVENOUS at 15:27

## 2019-04-11 RX ADMIN — MORPHINE SULFATE 4 MG: 4 INJECTION INTRAVENOUS at 06:02

## 2019-04-11 RX ADMIN — DOCUSATE SODIUM 100 MG: 100 CAPSULE, LIQUID FILLED ORAL at 20:16

## 2019-04-11 RX ADMIN — DEXAMETHASONE SODIUM PHOSPHATE 8 MG: 4 INJECTION, SOLUTION INTRAMUSCULAR; INTRAVENOUS at 15:33

## 2019-04-11 RX ADMIN — TAZOBACTAM SODIUM AND PIPERACILLIN SODIUM 3.38 G: 375; 3 INJECTION, SOLUTION INTRAVENOUS at 12:00

## 2019-04-11 ASSESSMENT — PULMONARY FUNCTION TESTS
PIF_VALUE: 37
PIF_VALUE: 25
PIF_VALUE: 33
PIF_VALUE: 27
PIF_VALUE: 26
PIF_VALUE: 39
PIF_VALUE: 21
PIF_VALUE: 37
PIF_VALUE: 35
PIF_VALUE: 34
PIF_VALUE: 34
PIF_VALUE: 28
PIF_VALUE: 1
PIF_VALUE: 35
PIF_VALUE: 5
PIF_VALUE: 0
PIF_VALUE: 37
PIF_VALUE: 32
PIF_VALUE: 27
PIF_VALUE: 36
PIF_VALUE: 1
PIF_VALUE: 31
PIF_VALUE: 27
PIF_VALUE: 33
PIF_VALUE: 30
PIF_VALUE: 32
PIF_VALUE: 35
PIF_VALUE: 27
PIF_VALUE: 26
PIF_VALUE: 30
PIF_VALUE: 26
PIF_VALUE: 20
PIF_VALUE: 0
PIF_VALUE: 34
PIF_VALUE: 41
PIF_VALUE: 35
PIF_VALUE: 39
PIF_VALUE: 19
PIF_VALUE: 32
PIF_VALUE: 35
PIF_VALUE: 0
PIF_VALUE: 35
PIF_VALUE: 37
PIF_VALUE: 0
PIF_VALUE: 3
PIF_VALUE: 0
PIF_VALUE: 36
PIF_VALUE: 30
PIF_VALUE: 37
PIF_VALUE: 34
PIF_VALUE: 34
PIF_VALUE: 30
PIF_VALUE: 32
PIF_VALUE: 37
PIF_VALUE: 27
PIF_VALUE: 23
PIF_VALUE: 34
PIF_VALUE: 36
PIF_VALUE: 35
PIF_VALUE: 34
PIF_VALUE: 33
PIF_VALUE: 35
PIF_VALUE: 34
PIF_VALUE: 38
PIF_VALUE: 35
PIF_VALUE: 34
PIF_VALUE: 31
PIF_VALUE: 26
PIF_VALUE: 36
PIF_VALUE: 33
PIF_VALUE: 36
PIF_VALUE: 26
PIF_VALUE: 34
PIF_VALUE: 35
PIF_VALUE: 33
PIF_VALUE: 2
PIF_VALUE: 36
PIF_VALUE: 2
PIF_VALUE: 33
PIF_VALUE: 26
PIF_VALUE: 8
PIF_VALUE: 35
PIF_VALUE: 32
PIF_VALUE: 28
PIF_VALUE: 36
PIF_VALUE: 34
PIF_VALUE: 36
PIF_VALUE: 27
PIF_VALUE: 41
PIF_VALUE: 33

## 2019-04-11 ASSESSMENT — PAIN DESCRIPTION - ORIENTATION
ORIENTATION: RIGHT;MID
ORIENTATION: RIGHT;MID

## 2019-04-11 ASSESSMENT — PAIN DESCRIPTION - DESCRIPTORS
DESCRIPTORS: SHARP
DESCRIPTORS: SHARP

## 2019-04-11 ASSESSMENT — PAIN DESCRIPTION - LOCATION
LOCATION: ABDOMEN

## 2019-04-11 ASSESSMENT — PAIN SCALES - GENERAL
PAINLEVEL_OUTOF10: 5
PAINLEVEL_OUTOF10: 7
PAINLEVEL_OUTOF10: 5
PAINLEVEL_OUTOF10: 9
PAINLEVEL_OUTOF10: 10
PAINLEVEL_OUTOF10: 7
PAINLEVEL_OUTOF10: 7

## 2019-04-11 ASSESSMENT — PAIN DESCRIPTION - PAIN TYPE
TYPE: SURGICAL PAIN
TYPE: SURGICAL PAIN
TYPE: ACUTE PAIN
TYPE: SURGICAL PAIN

## 2019-04-11 NOTE — PROGRESS NOTES
Patient transported to [id number]. Vital signs stable; pain controlled; no nausea; awake and appropriate; tolerating po ice chips.   Telephone report called to wilton solorzano.

## 2019-04-11 NOTE — ANESTHESIA PRE PROCEDURE
Department of Anesthesiology  Preprocedure Note       Name:  Nina Aparicio   Age:  46 y.o.  :  1965                                          MRN:  [id number]         Date:  4/10/2019      Surgeon: Refugio Huffman):  Margot Cuevas MD    Procedure: CHOLECYSTECTOMY LAPAROSCOPIC (N/A Abdomen)    Medications prior to admission:   Prior to Admission medications    Medication Sig Start Date End Date Taking?  Authorizing Provider   aspirin 325 MG tablet Take 325 mg by mouth daily   Yes Historical Provider, MD       Current medications:    Current Facility-Administered Medications   Medication Dose Route Frequency Provider Last Rate Last Dose    sodium chloride flush 0.9 % injection 10 mL  10 mL Intravenous BID Margot Cuevas MD   10 mL at 04/10/19 1707    sodium chloride flush 0.9 % injection 10 mL  10 mL Intravenous 2 times per day Margot Cuevas MD        sodium chloride flush 0.9 % injection 10 mL  10 mL Intravenous PRN Margot Cuevas MD        acetaminophen (TYLENOL) tablet 650 mg  650 mg Oral Q4H PRN Margot Cuevas MD        lactated ringers infusion   Intravenous Continuous Margot Cuevas  mL/hr at 04/10/19 1843      HYDROcodone-acetaminophen (NORCO) 5-325 MG per tablet 1 tablet  1 tablet Oral Q4H PRN Margot Cuevas MD        Or    HYDROcodone-acetaminophen [facility]) 5-325 MG per tablet 2 tablet  2 tablet Oral Q4H PRN Margot Cuevas MD   2 tablet at 04/10/19 1843    HYDROmorphone (DILAUDID) injection 0.25 mg  0.25 mg Intravenous Q3H PRN Margot Cuevas MD        Or   [facility] HYDROmorphone (DILAUDID) injection 0.5 mg  0.5 mg Intravenous Q3H PRN Margot Cuevas MD   0.5 mg at 04/10/19 2113    ondansetron (ZOFRAN) injection 4 mg  4 mg Intravenous Q6H PRN Margot Cuevas MD   4 mg at 04/10/19 2210    [START ON 2019] piperacillin-tazobactam (ZOSYN) 3.375 g in dextrose 50 mL IVPB extended infusion (premix)  3.375 g Intravenous Q8H Harrison Julian Shelbie Chambers MD           Allergies:  No Known Allergies    Problem List:    Patient Active Problem List   Diagnosis Code    Acute cholecystitis K81.0       Past Medical History:        Diagnosis Date    CAD (coronary artery disease)     Hx of blood clots 2007    clot to leg after a heart cath, treated with tPA       Past Surgical History:        Procedure Laterality Date    CARDIAC CATHETERIZATION  2007       Social History:    Social History     Tobacco Use    Smoking status: Current Every Day Smoker     Packs/day: 0.50     Types: Cigarettes    Smokeless tobacco: Never Used   Substance Use Topics    Alcohol use: No                                Ready to quit: Not Answered  Counseling given: Not Answered      Vital Signs (Current):   Vitals:    04/10/19 1537 04/10/19 1639 04/10/19 1821 04/10/19 2145   BP: (!) 172/87 (!) 165/97 134/84 136/78   Pulse: 88 93 95 80   Resp: 15 15 16 16   Temp:   36.6 °C (97.9 °F) 37.3 °C (99.2 °F)   TempSrc:  Oral Oral Oral   SpO2: 99% 99% 95% 95%   Weight:       Height:                                                  BP Readings from Last 3 Encounters:   04/10/19 136/78   12/16/17 131/89       NPO Status:                                                                                 BMI:   Wt Readings from Last 3 Encounters:   04/10/19 200 lb (90.7 kg)   12/19/17 210 lb (95.3 kg)   12/16/17 200 lb (90.7 kg)     Body mass index is 35.43 kg/m².     CBC:   Lab Results   Component Value Date    WBC 13.6 04/10/2019    RBC 4.84 04/10/2019    HGB 13.0 04/10/2019    HCT 43.5 04/10/2019    MCV 89.9 04/10/2019    RDW 13.0 04/10/2019     04/10/2019       CMP:   Lab Results   Component Value Date     04/10/2019    K 3.7 04/10/2019    CL 96 04/10/2019    CO2 27 04/10/2019    BUN 8 04/10/2019    CREATININE 0.7 04/10/2019    GFRAA >60 04/10/2019    LABGLOM >60 04/10/2019    GLUCOSE 107 04/10/2019    PROT 7.0 04/10/2019    CALCIUM 8.6 04/10/2019    BILITOT 0.6 04/10/2019    ALKPHOS 51 04/10/2019    AST 12 04/10/2019    ALT 11 04/10/2019       POC Tests: No results for input(s): POCGLU, POCNA, POCK, POCCL, POCBUN, POCHEMO, POCHCT in the last 72 hours. Coags:   Lab Results   Component Value Date    PROTIME 10.2 11/01/2010    INR 0.94 11/01/2010    APTT 24.0 11/01/2010       HCG (If Applicable): No results found for: PREGTESTUR, PREGSERUM, HCG, HCGQUANT     ABGs: No results found for: PHART, PO2ART, DNC4PGN, MPR9NZK, BEART, F9KWYBHB     Type & Screen (If Applicable):  No results found for: [facility]    Anesthesia Evaluation  Patient summary reviewed no history of anesthetic complications:   Airway: Mallampati: II  TM distance: >3 FB     Mouth opening: > = 3 FB Dental:          Pulmonary:                              Cardiovascular:    (+) CAD: obstructive,                   Neuro/Psych:               GI/Hepatic/Renal:            ROS comment: FINDINGS:  Lower Chest: Partially visualized soft tissue nodular density anterior right  middle lobe is about 6 mm (at only level visualized) series 2, image 1.  This  lung level is not included on prior CT abdomen.     Organs: Chronic appearing innumerable simple hepatic cysts unchanged from  prior study.  The liver appears otherwise unremarkable.  The gallbladder is  dilated and thick walled with pericholecystic fatty induration.  Focal high  density is noted at the gallbladder neck typical of cholelithiasis.  1 cm  simple cyst upper pole left kidney is stable.  Right kidney, spleen, pancreas  and adrenal glands are unremarkable.     GI/Bowel: No diffuse or focal bowel wall thickening evident. No inflammatory  changes evident. No obstruction is seen.  The appendix is visualized right  lower quadrant, unremarkable in appearance.     Pelvis:  The uterus and adnexal structures appear unremarkable.  Urinary  bladder is partially filled, unremarkable appearance.  No adenopathy or free  fluid.  Right iliac artery wall stent noted.     Peritoneum/Retroperitoneum: Unremarkable appearance of the aorta.  No  aneurysm.  Unremarkable appearance of the IVC. No adenopathy or fluid.     Bones/Soft Tissues: No acute superficial soft tissue or osseous structure  abnormality evident.        Impression  1. Findings of acute cholecystitis. 2. Probable cholelithiasis. 3. Stable simple hepatic cysts and left renal cyst; these are benign findings. 4. Indeterminate partially visualized right middle lobe pulmonary nodule  should be further evaluated with nonemergent unenhanced CT chest.  5. Remainder of the CT abdomen and pelvis appears unremarkable. .   Endo/Other:                     Abdominal:           Vascular:                                      Anesthesia Plan      general     ASA 3       Induction: intravenous. MIPS: Postoperative opioids intended and Prophylactic antiemetics administered. Anesthetic plan and risks discussed with patient. Use of blood products discussed with patient whom.                    Gini Peabody, TREMAYNE - CRNA   4/10/2019

## 2019-04-11 NOTE — OP NOTE
GENERAL SURGERY BRIEF OPERATIVE NOTE  [facility] [place] [facility]    PATIENT: Katrin Rebollar 1965   MRN: [id number]    DATE: 4/11/2019    SURGEON: Vidhi Gardner MD    CASE ID: [id number]     PROCEDURE(S) PERFORMED:     CHOLECYSTECTOMY LAPAROSCOPIC: 77077 (CPT®)    PREOPERATIVE DIAGNOSIS:  acute cholecystitis    POSTOPERATIVE DIAGNOSIS:   same    INDICATIONS: Katrin Rebollar is a 46 y.o. female presenting with symptomatic cholelithiasis versus acute cholecystitis for which cholecystectomy is recommended. The risks, benefits, potential complications and possible alternatives of the procedure were discussed in detail, including complications of but not limited to infection, bleeding, anesthesia-related complications, death, injury to surrounding structures, bile duct injury or leak, or need for additional interventions. All questions were answered. The patient wished to proceed and consent was documented in the medical record. FINDINGS:   Acute cholecystitis - thickened inflamed gallbladder, large gallstone near the neck of the gallbladder    ANESTHESIA:   General endotracheal anesthesia  Anesthesiologist: Remi Carpenter MD  CRNA: TREMAYNE Cherry CRNA    STAFF:   Scrub Person First: William Phillips    ESTIMATED BLOOD LOSS: Minimal    SPECIMEN(S):   ID Type Source Tests Collected by Time Destination   A : GALLBLADDER Tissue Tissue SURGICAL Jarrod Gonzalez MD 4/11/2019 1528        DRAINS & IMPLANTS:  * No implants in log *     COMPLICATIONS: none    DISPOSITION: PACU - hemodynamically stable. CONDITION: stable     PROCEDURE IN DETAIL:  Prior to beginning the procedure, informed consent was obtained and consent was documented in the medical record. The patient was brought to the operating room and positioned supine on the operating table. Anesthesia was initiated and a time out was performed in which all were in agreement. Appropriate perioperative antibiotics were administered. The intended trocar sites were anesthetized with 0.5% Marcaine. A 5mm Visiport trocar was used to enter the abdomen in the right upper quadrant and pneumoperitoneum achieved. There was no apparent injury. Subsequent 11mm subxiphoid and 5mm periumbilical trocar sites were placed under laparoscopic visualization. An additional 5mm trocar was also placed in the lateral right upper quadrant. The gallbladder was identified, and appeared acutely inflamed with a large gallstone palpable near the neck of the gallbladder. There were mild adhesions of the omentum to the gallbladder which were reduced with electrocautery. The gallbladder was grasped and retracted toward the right upper quadrant. The gallbladder was decompressed near the fundus to facilitate grasping it, and the bile was suctioned out. After a careful dissection the triangle of the Calot was identified. The cystic duct and artery were identified. Dissection was continued until a critical view of safety was achieved. The cystic duct and artery were clipped with 2 clips on the retained side and a single clip on the gallbladder side, then divided. The gallbladder was taken off the liver bed using electrocautery. Good hemostasis was achieved and a piece of Fibrillar was placed in the gallbladder bed because it was raw but not oozing. The gallbladder was placed in an Endo-Catch bag and removed from the subxiphoid trocar site and sent for pathology. The right upper quadrant was inspected. It was irrigated and suctioned dry. The liver bed was hemostatic and clips appeared intact. The trocars were removed under visualization without evidence of injury or bleeding. The fascia was closed with #1 Vicryl suture in interrupted fashion using a [place] suture passer . Pneumoperitoneum was evacuated from the abdominal cavity. The skin was closed with subcuticular 4-0 Vicryl sutures. The procedure was concluded.  The skin was washed and dried and sterile dressings applied with Steri-Strips. The patient tolerated the procedure well with no apparent complications and was transferred to PACU - hemodynamically stable. I was present and primarily performed all critical portions of the case.       Electronically signed:   Tj Augustine MD 4/11/2019 4:49 PM

## 2019-04-11 NOTE — PROGRESS NOTES
Patient arrived to pacu from OR; placed on monitor. Vital signs stable; appropriately responsive. Bedside report taken from wilton yu and Abeba Drake crna.

## 2019-04-12 LAB
ALBUMIN SERPL-MCNC: 3.7 GM/DL (ref 3.4–5)
ALP BLD-CCNC: 62 IU/L (ref 40–129)
ALT SERPL-CCNC: 94 U/L (ref 10–40)
ANION GAP SERPL CALCULATED.3IONS-SCNC: 11 MMOL/L (ref 4–16)
AST SERPL-CCNC: 127 IU/L (ref 15–37)
BILIRUB SERPL-MCNC: 0.3 MG/DL (ref 0–1)
BUN BLDV-MCNC: 6 MG/DL (ref 6–23)
CALCIUM SERPL-MCNC: 9 MG/DL (ref 8.3–10.6)
CHLORIDE BLD-SCNC: 101 MMOL/L (ref 99–110)
CO2: 27 MMOL/L (ref 21–32)
CREAT SERPL-MCNC: 0.6 MG/DL (ref 0.6–1.1)
GFR AFRICAN AMERICAN: >60 ML/MIN/1.73M2
GFR NON-AFRICAN AMERICAN: >60 ML/MIN/1.73M2
GLUCOSE BLD-MCNC: 134 MG/DL (ref 70–99)
HCT VFR BLD CALC: 41 % (ref 37–47)
HEMOGLOBIN: 12.1 GM/DL (ref 12.5–16)
MCH RBC QN AUTO: 26.8 PG (ref 27–31)
MCHC RBC AUTO-ENTMCNC: 29.5 % (ref 32–36)
MCV RBC AUTO: 90.9 FL (ref 78–100)
PDW BLD-RTO: 13 % (ref 11.7–14.9)
PLATELET # BLD: 203 K/CU MM (ref 140–440)
PMV BLD AUTO: 12.2 FL (ref 7.5–11.1)
POTASSIUM SERPL-SCNC: 4.8 MMOL/L (ref 3.5–5.1)
RBC # BLD: 4.51 M/CU MM (ref 4.2–5.4)
SODIUM BLD-SCNC: 139 MMOL/L (ref 135–145)
TOTAL PROTEIN: 6.4 GM/DL (ref 6.4–8.2)
WBC # BLD: 10.2 K/CU MM (ref 4–10.5)

## 2019-04-12 PROCEDURE — 96375 TX/PRO/DX INJ NEW DRUG ADDON: CPT

## 2019-04-12 PROCEDURE — 99024 POSTOP FOLLOW-UP VISIT: CPT | Performed by: SURGERY

## 2019-04-12 PROCEDURE — 36415 COLL VENOUS BLD VENIPUNCTURE: CPT

## 2019-04-12 PROCEDURE — 96372 THER/PROPH/DIAG INJ SC/IM: CPT

## 2019-04-12 PROCEDURE — 2580000003 HC RX 258: Performed by: SURGERY

## 2019-04-12 PROCEDURE — 2500000003 HC RX 250 WO HCPCS: Performed by: SURGERY

## 2019-04-12 PROCEDURE — 6360000002 HC RX W HCPCS: Performed by: SURGERY

## 2019-04-12 PROCEDURE — 6370000000 HC RX 637 (ALT 250 FOR IP): Performed by: SURGERY

## 2019-04-12 PROCEDURE — 96376 TX/PRO/DX INJ SAME DRUG ADON: CPT

## 2019-04-12 PROCEDURE — 85027 COMPLETE CBC AUTOMATED: CPT

## 2019-04-12 PROCEDURE — 80053 COMPREHEN METABOLIC PANEL: CPT

## 2019-04-12 PROCEDURE — 96366 THER/PROPH/DIAG IV INF ADDON: CPT

## 2019-04-12 PROCEDURE — G0378 HOSPITAL OBSERVATION PER HR: HCPCS

## 2019-04-12 RX ORDER — DIPHENHYDRAMINE HCL 25 MG
25 TABLET ORAL NIGHTLY PRN
Status: DISCONTINUED | OUTPATIENT
Start: 2019-04-12 | End: 2019-04-13 | Stop reason: HOSPADM

## 2019-04-12 RX ORDER — KETOROLAC TROMETHAMINE 30 MG/ML
15 INJECTION, SOLUTION INTRAMUSCULAR; INTRAVENOUS EVERY 6 HOURS PRN
Status: DISCONTINUED | OUTPATIENT
Start: 2019-04-12 | End: 2019-04-13 | Stop reason: HOSPADM

## 2019-04-12 RX ADMIN — ENOXAPARIN SODIUM 40 MG: 40 INJECTION SUBCUTANEOUS at 08:54

## 2019-04-12 RX ADMIN — SODIUM CHLORIDE, POTASSIUM CHLORIDE, SODIUM LACTATE AND CALCIUM CHLORIDE: 600; 310; 30; 20 INJECTION, SOLUTION INTRAVENOUS at 16:37

## 2019-04-12 RX ADMIN — SODIUM CHLORIDE, POTASSIUM CHLORIDE, SODIUM LACTATE AND CALCIUM CHLORIDE: 600; 310; 30; 20 INJECTION, SOLUTION INTRAVENOUS at 19:20

## 2019-04-12 RX ADMIN — KETOROLAC TROMETHAMINE 15 MG: 30 INJECTION, SOLUTION INTRAMUSCULAR; INTRAVENOUS at 11:28

## 2019-04-12 RX ADMIN — DOCUSATE SODIUM 100 MG: 100 CAPSULE, LIQUID FILLED ORAL at 20:29

## 2019-04-12 RX ADMIN — TAZOBACTAM SODIUM AND PIPERACILLIN SODIUM 3.38 G: 375; 3 INJECTION, SOLUTION INTRAVENOUS at 09:00

## 2019-04-12 RX ADMIN — TAZOBACTAM SODIUM AND PIPERACILLIN SODIUM 3.38 G: 375; 3 INJECTION, SOLUTION INTRAVENOUS at 00:35

## 2019-04-12 RX ADMIN — DOCUSATE SODIUM 100 MG: 100 CAPSULE, LIQUID FILLED ORAL at 08:54

## 2019-04-12 RX ADMIN — KETOROLAC TROMETHAMINE 15 MG: 30 INJECTION, SOLUTION INTRAMUSCULAR; INTRAVENOUS at 22:45

## 2019-04-12 RX ADMIN — SODIUM CHLORIDE, PRESERVATIVE FREE 10 ML: 5 INJECTION INTRAVENOUS at 09:01

## 2019-04-12 RX ADMIN — TAZOBACTAM SODIUM AND PIPERACILLIN SODIUM 3.38 G: 375; 3 INJECTION, SOLUTION INTRAVENOUS at 23:24

## 2019-04-12 RX ADMIN — MORPHINE SULFATE 2 MG: 4 INJECTION, SOLUTION INTRAMUSCULAR; INTRAVENOUS at 03:01

## 2019-04-12 RX ADMIN — MORPHINE SULFATE 2 MG: 4 INJECTION, SOLUTION INTRAMUSCULAR; INTRAVENOUS at 08:53

## 2019-04-12 RX ADMIN — TAZOBACTAM SODIUM AND PIPERACILLIN SODIUM 3.38 G: 375; 3 INJECTION, SOLUTION INTRAVENOUS at 16:37

## 2019-04-12 RX ADMIN — SODIUM CHLORIDE, POTASSIUM CHLORIDE, SODIUM LACTATE AND CALCIUM CHLORIDE: 600; 310; 30; 20 INJECTION, SOLUTION INTRAVENOUS at 07:35

## 2019-04-12 ASSESSMENT — PAIN SCALES - GENERAL
PAINLEVEL_OUTOF10: 5
PAINLEVEL_OUTOF10: 8
PAINLEVEL_OUTOF10: 3
PAINLEVEL_OUTOF10: 8
PAINLEVEL_OUTOF10: 6

## 2019-04-12 ASSESSMENT — PAIN DESCRIPTION - ORIENTATION: ORIENTATION: RIGHT;MID

## 2019-04-12 ASSESSMENT — PAIN DESCRIPTION - DESCRIPTORS: DESCRIPTORS: SHARP

## 2019-04-12 ASSESSMENT — PAIN DESCRIPTION - LOCATION: LOCATION: ABDOMEN

## 2019-04-12 ASSESSMENT — PAIN DESCRIPTION - PAIN TYPE: TYPE: SURGICAL PAIN

## 2019-04-12 NOTE — CARE COORDINATION
CM into see pt to initiate a safe discharge plan. Cm into see, introduced self and explained role of CM. Pt is alone in room. Pt is kind, alert and oriented. Pt lives alone in a one floor home. Pt has a dtr but describes them as not being on good terms. Pt shared that she has a neighbor that looks after her. Pt typically is independent for all her ADL's. Pt works and able to drive. Pt has a PCP. Pt has insurance and shared that she hasnt really used her insurance for prescriptions. Pt is crying while CM in room. Pt shared that she is afraid that she can not afford this hospital stay, stating I am the working poor. CM offered to call and obtained permission to call  Financial Counselor. CM left  for [place] to follow up. Due to pts little support CM offered and encouraged home care when discharged as an option. Pt is agreeable but again wants it only if insurance will cover 100 %. Pt did not have a preference of agency. Pt is agreeable to [facility]. Cm placed a PS to Tami to request home care. CM will continue to follow. CM updated white board and encouraged to call for any needs or concern. CM is available if any needs arise. 1115 CM received PS that pt is unable to qualify for home care due to not having seen her pcp for over 10 yrs. [facility] informed by Dean Kirby.  [address]

## 2019-04-12 NOTE — PROGRESS NOTES
GENERAL SURGERY INPATIENT POST-OP NOTE  [facility]    PATIENT: Milton Skaggs, 46 y.o., female, MRN: [id number]    Hospital Day:  LOS: 0 days , Post-Op Day: 1 Day Post-Op    Procedure(s): laparoscopic cholecystectomy 19    Milton Skaggs is a 46 y.o. female who presented with findings of acute cholecystitis and abdominal pain. Subjective:  Chief Complaint: abdominal pain/soreness  Pain: 7/10  BM: none since surgery   Diet: DIET CLEAR LIQUID;  Activity: as tolerated    She has been sore since surgery - isn't moving around much due to pain, but did get up to the bathroom. No N/V. Only got clears last night.      Objective:    Vitals: BP (!) 156/87   Pulse 75   Temp 98.3 °F (36.8 °C) (Oral)   Resp 17   Ht 5' 3\" (1.6 m)   Wt 200 lb (90.7 kg)   LMP 2008   SpO2 93%   BMI 35.43 kg/m²   Vital Signs (Last 24 Hours)  Temp  Av.1 °F (36.7 °C)  Min: 91.7 °F (33.2 °C)  Max: 99.2 °F (37.3 °C)  Pulse  Av.9  Min: 74  Max: 94  BP  Min: 83/56  Max: 180/94  Resp  Av.3  Min: 1  Max: 23  SpO2  Av.2 %  Min: 93 %  Max: 100 %  Wt Readings from Last 3 Encounters:   19 200 lb (90.7 kg)   17 210 lb (95.3 kg)   17 200 lb (90.7 kg)       I/O:  0701 -  0700  In: 3099.3 [P.O.:480; I.V.:2469.3]  Out: 25     IV Fluids: lactated ringers Last Rate: 125 mL/hr at 19 0735    Scheduled Meds: sodium chloride flush, 10 mL, Intravenous, 2 times per day    enoxaparin, 40 mg, Subcutaneous, Daily    docusate sodium, 100 mg, Oral, BID    sodium chloride flush, 10 mL, Intravenous, BID    sodium chloride flush, 10 mL, Intravenous, 2 times per day    piperacillin-tazobactam, 3.375 g, Intravenous, Q8H    Physical Exam:  General Appearance:   Alert, cooperative, no distress    Head:   Normocephalic, atraumatic    Lungs:    Equal chest rise, respirations unlabored   Heart:   Regular rate and rhythm    Abdomen:    Soft, appropriately tender, no rebound or guarding, dressings C/D/I with minimal strikethrough. Extremities:  No cyanosis or edema    Neurologic:  Nonfocal, grossly intact        Labs/Imaging Results:   Recent Results (from the past 24 hour(s))   Comprehensive Metabolic Panel    Collection Time: 04/12/19  6:09 AM   Result Value Ref Range    Sodium 139 135 - 145 MMOL/L    Potassium 4.8 3.5 - 5.1 MMOL/L    Chloride 101 99 - 110 mMol/L    CO2 27 21 - 32 MMOL/L    BUN 6 6 - 23 MG/DL    CREATININE 0.6 0.6 - 1.1 MG/DL    Glucose 134 (H) 70 - 99 MG/DL    Calcium 9.0 8.3 - 10.6 MG/DL    Alb 3.7 3.4 - 5.0 GM/DL    Total Protein 6.4 6.4 - 8.2 GM/DL    Total Bilirubin 0.3 0.0 - 1.0 MG/DL    ALT 94 (H) 10 - 40 U/L     (H) 15 - 37 IU/L    Alkaline Phosphatase 62 40 - 129 IU/L    GFR Non-African American >60 >60 mL/min/1.73m2    GFR African American >60 >60 mL/min/1.73m2    Anion Gap 11 4 - 16   CBC    Collection Time: 04/12/19  6:09 AM   Result Value Ref Range    WBC 10.2 4.0 - 10.5 K/CU MM    RBC 4.51 4.2 - 5.4 M/CU MM    Hemoglobin 12.1 (L) 12.5 - 16.0 GM/DL    Hematocrit 41.0 37 - 47 %    MCV 90.9 78 - 100 FL    MCH 26.8 (L) 27 - 31 PG    MCHC 29.5 (L) 32.0 - 36.0 %    RDW 13.0 11.7 - 14.9 %    Platelets 420 273 - 078 K/CU MM    MPV 12.2 (H) 7.5 - 11.1 FL         Assessment:  Liddie Kayser is a 46 y.o. female who is post-op day #1 Day Post-Op laparoscopic cholecystectomy. Active Problems:    Acute cholecystitis  Resolved Problems:    * No resolved hospital problems. *      Plan:  · Advance diet as tolerated  · Will give an abdominal binder for comfort  · Mobilize as able  · Continue pain control - if better this afternoon, maybe home today. Likely will stay overnight and home tomorrow.      Electronically signed: Noemi Calix MD 4/12/2019 9:35 AM

## 2019-04-12 NOTE — PROGRESS NOTES
I met with patient on 4/12/19 for bedside tobacco rounding. Skip Montalvo stated that she smokes about 5 cigarettes per day. I explained that [facility] cares about her health and advised her to quit. Skip Montalvo stated that she would like to quit. Skip Montalvo is not using the nicotine patch and not having intense cravings. We discussed health concerns, reported by the patient-CAD-and the benefits of quitting. We discussed building a quit plan, identifying triggers, ways to fight cravings, modifying behaviors and building a quit kit to assist. I explained the REACH cessation program, provided educational information, and strongly encouraged Skip Montalvo to contact me for outpatient services. I also explained the [address]. Skip Montalvo again stated that she is very interested in quitting and asked about alternatives to keep her hands busy. We discussed several ideas.       Vane Padilla, Certified Tobacco Treatment Specialist, [facility]

## 2019-04-12 NOTE — PROGRESS NOTES
[facility] Liaison spoke with patient, sh has no been to hr PCP in over 8 ys. HHC can not be initiated & pt is aware. Updated Murray.

## 2019-04-12 NOTE — ANESTHESIA POSTPROCEDURE EVALUATION
Department of Anesthesiology  Postprocedure Note    Patient: Hannah Jacob  MRN: [id number]  YOB: 1965  Date of evaluation: 4/12/2019  Time:  6:53 AM     Procedure Summary     Date:  04/11/19 Room / Location:  [place] [address] 06 / [address] [facility]    Anesthesia Start:  9637 Anesthesia Stop:  1703    Procedure:  CHOLECYSTECTOMY LAPAROSCOPIC (N/A Abdomen) Diagnosis:  (acute cholecystitis)    Surgeon:  Cliff Sanchez MD Responsible Provider:  Angelito Marion MD    Anesthesia Type:  general ASA Status:  3          Anesthesia Type: general  Late entry  Last vitals: Reviewed and per EMR flowsheets.        Anesthesia Post Evaluation    Patient location during evaluation: PACU  Level of consciousness: awake  Airway patency: patent  Nausea & Vomiting: no nausea and no vomiting  Complications: no  Cardiovascular status: hemodynamically stable  Respiratory status: acceptable

## 2019-04-13 VITALS
WEIGHT: 200 LBS | BODY MASS INDEX: 35.44 KG/M2 | DIASTOLIC BLOOD PRESSURE: 83 MMHG | TEMPERATURE: 98.7 F | SYSTOLIC BLOOD PRESSURE: 134 MMHG | RESPIRATION RATE: 17 BRPM | OXYGEN SATURATION: 97 % | HEART RATE: 74 BPM | HEIGHT: 63 IN

## 2019-04-13 PROBLEM — K81.0 ACUTE CHOLECYSTITIS: Status: RESOLVED | Noted: 2019-04-10 | Resolved: 2019-04-13

## 2019-04-13 PROCEDURE — 6360000002 HC RX W HCPCS: Performed by: SURGERY

## 2019-04-13 PROCEDURE — 2580000003 HC RX 258: Performed by: SURGERY

## 2019-04-13 PROCEDURE — 96372 THER/PROPH/DIAG INJ SC/IM: CPT

## 2019-04-13 PROCEDURE — 6370000000 HC RX 637 (ALT 250 FOR IP): Performed by: SURGERY

## 2019-04-13 PROCEDURE — 99024 POSTOP FOLLOW-UP VISIT: CPT | Performed by: SURGERY

## 2019-04-13 PROCEDURE — 2500000003 HC RX 250 WO HCPCS: Performed by: SURGERY

## 2019-04-13 PROCEDURE — 96366 THER/PROPH/DIAG IV INF ADDON: CPT

## 2019-04-13 PROCEDURE — 94761 N-INVAS EAR/PLS OXIMETRY MLT: CPT

## 2019-04-13 PROCEDURE — G0378 HOSPITAL OBSERVATION PER HR: HCPCS

## 2019-04-13 PROCEDURE — 96376 TX/PRO/DX INJ SAME DRUG ADON: CPT

## 2019-04-13 RX ORDER — HYDROCODONE BITARTRATE AND ACETAMINOPHEN 5; 325 MG/1; MG/1
1 TABLET ORAL EVERY 6 HOURS PRN
Qty: 28 TABLET | Refills: 0 | Status: SHIPPED | OUTPATIENT
Start: 2019-04-13 | End: 2019-04-20

## 2019-04-13 RX ADMIN — SODIUM CHLORIDE, POTASSIUM CHLORIDE, SODIUM LACTATE AND CALCIUM CHLORIDE: 600; 310; 30; 20 INJECTION, SOLUTION INTRAVENOUS at 03:57

## 2019-04-13 RX ADMIN — TAZOBACTAM SODIUM AND PIPERACILLIN SODIUM 3.38 G: 375; 3 INJECTION, SOLUTION INTRAVENOUS at 08:07

## 2019-04-13 RX ADMIN — ENOXAPARIN SODIUM 40 MG: 40 INJECTION SUBCUTANEOUS at 08:07

## 2019-04-13 RX ADMIN — DOCUSATE SODIUM 100 MG: 100 CAPSULE, LIQUID FILLED ORAL at 08:07

## 2019-04-13 RX ADMIN — KETOROLAC TROMETHAMINE 15 MG: 30 INJECTION, SOLUTION INTRAMUSCULAR; INTRAVENOUS at 11:02

## 2019-04-13 ASSESSMENT — PAIN DESCRIPTION - PROGRESSION
CLINICAL_PROGRESSION: NOT CHANGED

## 2019-04-13 ASSESSMENT — PAIN DESCRIPTION - ORIENTATION
ORIENTATION: RIGHT;MID
ORIENTATION: RIGHT;MID

## 2019-04-13 ASSESSMENT — PAIN DESCRIPTION - FREQUENCY
FREQUENCY: INTERMITTENT
FREQUENCY: INTERMITTENT

## 2019-04-13 ASSESSMENT — PAIN - FUNCTIONAL ASSESSMENT
PAIN_FUNCTIONAL_ASSESSMENT: ACTIVITIES ARE NOT PREVENTED
PAIN_FUNCTIONAL_ASSESSMENT: ACTIVITIES ARE NOT PREVENTED

## 2019-04-13 ASSESSMENT — PAIN DESCRIPTION - DESCRIPTORS
DESCRIPTORS: ACHING
DESCRIPTORS: SHARP

## 2019-04-13 ASSESSMENT — PAIN DESCRIPTION - LOCATION
LOCATION: ABDOMEN
LOCATION: ABDOMEN

## 2019-04-13 ASSESSMENT — PAIN SCALES - GENERAL
PAINLEVEL_OUTOF10: 6
PAINLEVEL_OUTOF10: 5

## 2019-04-13 ASSESSMENT — PAIN DESCRIPTION - PAIN TYPE
TYPE: SURGICAL PAIN
TYPE: SURGICAL PAIN

## 2019-04-13 ASSESSMENT — PAIN DESCRIPTION - ONSET
ONSET: ON-GOING
ONSET: ON-GOING

## 2019-04-13 NOTE — DISCHARGE SUMMARY
[address] [facility] - General Surgery    Patient ID:  Arnaldo Fountain  Date: 2019 4:49 PM   MRN#: [id number] :1965   Admission Date:4/10/2019 Age/Sex:53 y.o. female       Discharge date and time: 2019  1:10 PM, (expected 2019)    Admitting Physician: Kishore Strong MD     Discharge Physician: same    Admission Diagnoses: Acute cholecystitis [K81.0]  Cholecystitis [K81.9]  Abdominal pain, unspecified abdominal location [R10.9]  Constipation, unspecified constipation type [K59.00]  Nausea and vomiting, intractability of vomiting not specified, unspecified vomiting type [R11.2]    Discharge Diagnoses: Active Problems:    * No active hospital problems. *  Resolved Problems:    Acute cholecystitis       Admission Condition: stable     Discharged Condition: stable    Indication for Admission: Active Problems:    * No active hospital problems. *  Resolved Problems:    Acute cholecystitis       Hospital Course:   Connie Ruvalcaba is a 46 y.o. female presented with clinical signs and symptoms consistent with acute cholecystitis. She underwent laparoscopic cholecystectomy on 19. Postoperatively she had some difficulty with pain control, but improved with changes to her pain regimen; then was progressed to tolerating a diet, had adequate pain control, and felt ready for discharge home on 2 Days Post-Op     Consults: none    Significant Diagnostic Studies: see chart    Treatments: surgery/procedure:   19 laparoscopic cholecystectomy    Disposition: Home or Self Care    Patient Instructions:    Nic Márquez   Home Medication Instructions [id number]    Printed on:19 1649   Medication Information                      aspirin 325 MG tablet  Take 325 mg by mouth daily             HYDROcodone-acetaminophen (NORCO) 5-325 MG per tablet  Take 1 tablet by mouth every 6 hours as needed for Pain for up to 7 days. Intended supply: 7 days.  Take lowest dose

## 2019-04-13 NOTE — DISCHARGE INSTR - ACTIVITY
No lifting more than 10-15 lbs for 4 weeks. May increase lifting amount by 10 lbs per week after 4 weeks.

## 2019-04-16 LAB
EKG ATRIAL RATE: 86 BPM
EKG DIAGNOSIS: NORMAL
EKG P AXIS: 34 DEGREES
EKG P-R INTERVAL: 182 MS
EKG Q-T INTERVAL: 366 MS
EKG QRS DURATION: 88 MS
EKG QTC CALCULATION (BAZETT): 437 MS
EKG R AXIS: -9 DEGREES
EKG T AXIS: 33 DEGREES
EKG VENTRICULAR RATE: 86 BPM

## 2019-04-23 ENCOUNTER — OFFICE VISIT (OUTPATIENT)
Dept: SURGERY | Age: 54
End: 2019-04-23

## 2019-04-23 VITALS
DIASTOLIC BLOOD PRESSURE: 80 MMHG | SYSTOLIC BLOOD PRESSURE: 130 MMHG | HEIGHT: 63 IN | BODY MASS INDEX: 39.69 KG/M2 | HEART RATE: 95 BPM | WEIGHT: 224 LBS | RESPIRATION RATE: 21 BRPM

## 2019-04-23 DIAGNOSIS — K81.0 ACUTE CHOLECYSTITIS: Primary | ICD-10-CM

## 2019-04-23 DIAGNOSIS — K90.89 BILE SALT-INDUCED DIARRHEA: ICD-10-CM

## 2019-04-23 DIAGNOSIS — Z09 POSTOP CHECK: ICD-10-CM

## 2019-04-23 PROCEDURE — 99024 POSTOP FOLLOW-UP VISIT: CPT | Performed by: SURGERY

## 2019-04-23 RX ORDER — CHOLESTYRAMINE 4 G/9G
1 POWDER, FOR SUSPENSION ORAL 2 TIMES DAILY
Qty: 90 PACKET | Refills: 3 | Status: SHIPPED | OUTPATIENT
Start: 2019-04-23

## 2019-04-23 ASSESSMENT — ENCOUNTER SYMPTOMS
DIARRHEA: 1
ABDOMINAL DISTENTION: 0
VOMITING: 0
ABDOMINAL PAIN: 1
BLOOD IN STOOL: 0
CONSTIPATION: 0
NAUSEA: 0

## 2019-04-23 NOTE — PROGRESS NOTES
[id number] [facility]    PATIENT: Clara Napoles 1965, 46 y.o., female    MRN: [id number]    Physician: Max Lee MD    Date: 4/23/19    CHIEF COMPLAINT:     Chief Complaint   Patient presents with    Post-Op Check     1st P/O Lap Caterina @ [facility] 4/11/19     History Obtained From:  patient, electronic medical record    HISTORY OF PRESENT ILLNESS:      Mary Mcfarland is a 46 y.o. female presenting postoperatively after laparoscopic cholecystectomy for acute cholecystitis. Since the procedure, she has been doing ok overall, but has been having diarrhea after eating. Eating a regular diet without difficulty. Has been trying to eat low fat foods, but without a change in the diarrhea. Bowel movement are diarrhea after she eats. Pain is controlled with current analgesics. The subxiphoid incision has been hurting the most, especially after doing some lifting yesterday when she got groceries. Wounds/Incisions: are healing well. No drainage or erythema. Past Medical History:    Past Medical History:   Diagnosis Date    Acute cholecystitis 4/10/2019    CAD (coronary artery disease)     Hx of blood clots 2007    clot to leg after a heart cath, treated with tPA       Past Surgical History:    Past Surgical History:   Procedure Laterality Date    CARDIAC CATHETERIZATION  2007    CHOLECYSTECTOMY, LAPAROSCOPIC N/A 4/11/2019    CHOLECYSTECTOMY LAPAROSCOPIC performed by Melba Ivory MD at [facility] OR       Current Medications:   Current Outpatient Medications   Medication Sig Dispense Refill    cholestyramine (QUESTRAN) 4 g packet Take 1 packet by mouth 2 times daily 90 packet 3    aspirin 325 MG tablet Take 325 mg by mouth daily       No current facility-administered medications for this visit. Allergies:  Patient has no known allergies.     Social History:   Social History     Socioeconomic History    Marital status:  Spouse name: None    Number of children: None    Years of education: None    Highest education level: None   Occupational History    None   Social Needs    Financial resource strain: None    Food insecurity:     Worry: None     Inability: None    Transportation needs:     Medical: None     Non-medical: None   Tobacco Use    Smoking status: Former Smoker     Packs/day: 0.50     Types: Cigarettes     Last attempt to quit: 4/10/2019     Years since quittin.0    Smokeless tobacco: Never Used   Substance and Sexual Activity    Alcohol use: No    Drug use: No    Sexual activity: None   Lifestyle    Physical activity:     Days per week: None     Minutes per session: None    Stress: None   Relationships    Social connections:     Talks on phone: None     Gets together: None     Attends Sabianist service: None     Active member of club or organization: None     Attends meetings of clubs or organizations: None     Relationship status: None    Intimate partner violence:     Fear of current or ex partner: None     Emotionally abused: None     Physically abused: None     Forced sexual activity: None   Other Topics Concern    None   Social History Narrative    None       Family History:   No family history on file. REVIEW OF SYSTEMS:    Review of Systems   Constitutional: Negative for chills and fever. Gastrointestinal: Positive for abdominal pain (subxiphoid incision) and diarrhea. Negative for abdominal distention, blood in stool, constipation, nausea and vomiting. Skin: Negative for rash and wound. I have reviewed the patient's information pertinent to this visit, including medical history, family history, social history and review of systems.     PHYSICAL EXAM:    Vitals:    19 1333   BP: 130/80   Site: Left Upper Arm   Position: Sitting   Cuff Size: Medium Adult   Pulse: 95   Resp: 21   Weight: 224 lb (101.6 kg)   Height: 5' 3\" (1.6 m)       Physical Exam   Constitutional: She is oriented to person, place, and time. She appears well-developed and well-nourished. No distress. HENT:   Head: Normocephalic and atraumatic. Eyes: Pupils are equal, round, and reactive to light. Right eye exhibits no discharge. Left eye exhibits no discharge. Neck: Neck supple. No tracheal deviation present. Cardiovascular: Normal rate and regular rhythm. Pulmonary/Chest: Effort normal. No respiratory distress. She has no wheezes. Abdominal: Soft. She exhibits no distension. There is tenderness. There is no rebound and no guarding. Well healed laparoscopic scars, healing ecchymosis, no induration or erythema, no drainage. Tender at subxiphoid incision with suspected small subcutaneous hematoma. Musculoskeletal: She exhibits no tenderness or deformity. Neurological: She is alert and oriented to person, place, and time. Skin: Skin is warm and dry. No rash noted. She is not diaphoretic. Psychiatric: She has a normal mood and affect. Her behavior is normal.       DATA:    Pathology Results:   Specimen #[id number]      Final Pathologic Diagnosis:  Gallbladder:       Cholelithiasis.      Multifocal acute necrotizing cholecystitis on chronic  cholecystitis with prominent eosinophils        and active fibrosis. Labs:  Lab Results   Component Value Date    WBC 10.2 04/12/2019    HGB 12.1 (L) 04/12/2019    HCT 41.0 04/12/2019     04/12/2019     04/12/2019    K 4.8 04/12/2019     04/12/2019    CO2 27 04/12/2019    BUN 6 04/12/2019    CREATININE 0.6 04/12/2019    GLUCOSE 134 (H) 04/12/2019    CALCIUM 9.0 04/12/2019    PROT 6.4 04/12/2019    BILITOT 0.3 04/12/2019     (H) 04/12/2019    ALT 94 (H) 04/12/2019    ALKPHOS 62 04/12/2019    LIPASE 16 04/10/2019    INR 0.94 11/01/2010     Imaging:   N/a   Pertinent laboratory and imaging studies were personally reviewed if available.       IMPRESSION:    James Magallanes is a 46 y.o. female following-up postoperatively from laparoscopic cholecystectomy for acute cholecystitis. Visit Diagnoses:  1. Acute cholecystitis    2. Bile salt-induced diarrhea    3. Postop check        There are no active problems to display for this patient. PLAN:  Continue current care  Increase activity as tolerated, no lifting >20 lbs for 4 weeks total  Will give a script for cholestyramine for diarrhea  Follow Up: Return if symptoms worsen or fail to improve. No orders of the defined types were placed in this encounter.      Orders Placed This Encounter   Medications    cholestyramine (QUESTRAN) 4 g packet     Sig: Take 1 packet by mouth 2 times daily     Dispense:  90 packet     Refill:  3       Electronically signed by Max Lee MD, 4/23/2019, 6:30 PM.

## 2020-06-03 ENCOUNTER — OFFICE VISIT (OUTPATIENT)
Dept: PRIMARY CARE CLINIC | Age: 55
End: 2020-06-03
Payer: COMMERCIAL

## 2020-06-03 ENCOUNTER — HOSPITAL ENCOUNTER (OUTPATIENT)
Age: 55
Setting detail: SPECIMEN
Discharge: HOME | End: 2020-06-03
Payer: COMMERCIAL

## 2020-06-03 VITALS — OXYGEN SATURATION: 98 % | HEART RATE: 83 BPM | TEMPERATURE: 97.9 F

## 2020-06-03 PROCEDURE — G8421 BMI NOT CALCULATED: HCPCS | Performed by: FAMILY MEDICINE

## 2020-06-03 PROCEDURE — G8428 CUR MEDS NOT DOCUMENT: HCPCS | Performed by: FAMILY MEDICINE

## 2020-06-03 PROCEDURE — 3017F COLORECTAL CA SCREEN DOC REV: CPT | Performed by: FAMILY MEDICINE

## 2020-06-03 PROCEDURE — 4004F PT TOBACCO SCREEN RCVD TLK: CPT | Performed by: FAMILY MEDICINE

## 2020-06-03 PROCEDURE — 99213 OFFICE O/P EST LOW 20 MIN: CPT | Performed by: FAMILY MEDICINE

## 2020-06-03 PROCEDURE — U0002 COVID-19 LAB TEST NON-CDC: HCPCS

## 2020-06-03 NOTE — PATIENT INSTRUCTIONS
- Based on HPI and examination, you have viral or COVID-19  infection.  - Your covid-19 testing result takes 3-4 days to come back. [address] will notify the test result if it is POSITIVE. In case of NEGATIVE result, our office will notify you. You can also view the result through  [facility] [address]. - Fluid hydration with plain water was advised. Mix Gatorade with Pedialyte. I also recommend plane yogurt. - OTC cough medication, Zarbees with dark honey was suggested. - Stay at home and self quarantine until COVID-19 test result is available. If your COVID-19 test result is POSITIVE, 14 day self quarantine is recommended. If the test results is NEGATIVE, you can end your quarantine and return to your normal routine or work. - Please return to clinic or call us if symptoms are worsened.

## 2020-06-03 NOTE — PROGRESS NOTES
6/3/20  Jaxon Garner  1965    Employer: [employer]     FLU/COVID-19 CLINIC EVALUATION    HPI SYMPTOMS: Patient presented with 4-day history of fever chills and mild dry cough. Patient denies having any shortness of breath. Patient smoke about 10 cigarettes/day. Patient complaining about slight nausea and diarrhea about few days ago that has been resolved. Patient also complaining about loss of sense of smell and taste. Patient brother was recently test positive for COVID-19. Patient was in close contact with his brother. [x] Fevers  [] Chills  [x] Cough  [] Coughing up blood  [] Chest Congestion  [] Nasal Congestion  [] Feeling short of breath  [] Sometimes  [] Frequently  [] All the time  [] Chest pain  [x] Headaches  [x]Tolerable  [] Severe  [x] Sore throat  [] Muscle aches  [x] Nausea  [] Vomiting  []Unable to keep fluids down  [x] Diarrhea  []Severe    [x] OTHER SYMPTOMS: No smell or taste      Symptom Duration:   [] 1  [] 2   [] 3   [x] 4    [] 5   [] 6   [] 7   [] 8   [] 9   [] 10   [] 11   [] 12   [] 13   [] 14   [] Longer than 14 days    Symptom course:   [x] Worsening     [] Stable     [] Improving    RISK FACTORS:    [] Pregnant or possibly pregnant  [] Age over 61  [] Diabetes  [] Heart disease  [] Asthma  [] COPD/Other chronic lung diseases  [] Active Cancer  [] On Chemotherapy  [] Taking oral steroids  [] History Lymphoma/Leukemia  [] Close contact with a lab confirmed COVID-19 patient within 14 days of symptom onset  [] History of travel from affected geographical areas within 14 days of symptom onset       VITALS:  Vitals:    06/03/20 1559   Pulse: 83   Temp: 97.9 °F (36.6 °C)   SpO2: 98%      Physical Exam   Constitutional: She is oriented to person, place, and time. She appears well-developed and well-nourished. HENT:   Head: Normocephalic and atraumatic. Mouth/Throat: Oropharynx is clear and moist. No oropharyngeal exudate.    Eyes: Pupils are equal, round, and reactive to light. Conjunctivae are normal.   Neck: Normal range of motion. Neck supple. Cardiovascular: Normal rate, regular rhythm and normal heart sounds. No murmur heard. Pulmonary/Chest: Effort normal and breath sounds normal. She has no wheezes. Abdominal: Soft. Musculoskeletal: Normal range of motion. Neurological: She is alert and oriented to person, place, and time. Skin: Skin is warm and dry. Psychiatric: She has a normal mood and affect. Her behavior is normal. Judgment and thought content normal.         TESTS:    POCT FLU:  [] Positive     []Negative    ASSESSMENT:    [] Flu  [x] Possible COVID-19  [] Strep    PLAN:  - Based on HPI and examination, you have viral or COVID-19  infection.  - Your covid-19 testing result takes 3-4 days to come back. [address] will notify the test result if it is POSITIVE. In case of NEGATIVE result, our office will notify you. You can also view the result through  [address]. - Fluid hydration with plain water was advised. Mix Gatorade with Pedialyte. I also recommend plane yogurt. - OTC cough medication, Zarbees with dark honey was suggested. - Stay at home and self quarantine until COVID-19 test result is available. If your COVID-19 test result is POSITIVE, 14 day self quarantine is recommended. If the test results is NEGATIVE, you can end your quarantine and return to your normal routine or work. - Please return to clinic or call us if symptoms are worsened. [] Discharge home with written instructions for:  [] Flu management  [x] Possible COVID-19 infection with self-quarantine and management of symptoms  [x] Follow-up with primary care physician or emergency department if worsens  [] Evaluation per physician/nurse practitioner in clinic  [] Sent to ER       An  electronic signature was used to authenticate this note.      --Sammy Jordan,  on 6/3/2020 at 3:59 PM

## 2020-06-04 LAB
SARS-COV-2: NOT DETECTED
SOURCE: NORMAL

## 2020-11-16 ENCOUNTER — HOSPITAL ENCOUNTER (OUTPATIENT)
Dept: NEUROLOGY | Age: 55
Discharge: HOME | End: 2020-11-16
Payer: COMMERCIAL

## 2020-11-16 PROCEDURE — 95910 NRV CNDJ TEST 7-8 STUDIES: CPT

## 2020-11-16 PROCEDURE — 95886 MUSC TEST DONE W/N TEST COMP: CPT

## 2020-11-16 PROCEDURE — 95886 MUSC TEST DONE W/N TEST COMP: CPT | Performed by: PHYSICAL MEDICINE & REHABILITATION

## 2020-11-16 PROCEDURE — 95910 NRV CNDJ TEST 7-8 STUDIES: CPT | Performed by: PHYSICAL MEDICINE & REHABILITATION

## 2020-11-16 NOTE — PROCEDURES
Risks and benefits of study discussed. Specific and common risks of pain and bleeding, as well as uncommon side effects of infection, hematoma, vasovagal episodes. Patient agreeable to testing and consents to such. Clinical: Several years of lumbar pain with radiation into the right lower limb. Occasional paresthesia, no overt numbness or motor weakness reported. Motor NCS:  Bilateral tibial and peroneal motor amplitudes normal; all distal latencies normal and conduction velocities normal    Sensory NCS:  Bilateral sural and peroneal sensory responses are of low normal amplitude with normal latencies throughout. Needle EMG:  Mild enlargement of motor units in some right S1 leg muscle areas. Impression:    #1 mild enlargement of several S1 leg muscle motor units on the right side, without fibrillations or substantially reduced recruitment. These changes are consistent with mild chronic or old/inactive right S1 radiculopathy. No signs of severe, or recent, uncompensated axon loss. #2 no convincing evidence of generalized peripheral neuropathy, mononeuropathy, plexopathy or myopathy affecting the lower limbs.

## 2024-09-30 ENCOUNTER — HOSPITAL ENCOUNTER (EMERGENCY)
Age: 59
Discharge: HOME | End: 2024-09-30
Attending: EMERGENCY MEDICINE
Payer: COMMERCIAL

## 2024-09-30 ENCOUNTER — APPOINTMENT (OUTPATIENT)
Dept: GENERAL RADIOLOGY | Age: 59
End: 2024-09-30
Payer: COMMERCIAL

## 2024-09-30 VITALS
OXYGEN SATURATION: 99 % | SYSTOLIC BLOOD PRESSURE: 162 MMHG | HEART RATE: 89 BPM | DIASTOLIC BLOOD PRESSURE: 96 MMHG | RESPIRATION RATE: 18 BRPM | TEMPERATURE: 98.4 F

## 2024-09-30 DIAGNOSIS — H10.9 CONJUNCTIVITIS OF RIGHT EYE, UNSPECIFIED CONJUNCTIVITIS TYPE: ICD-10-CM

## 2024-09-30 DIAGNOSIS — R05.1 ACUTE COUGH: Primary | ICD-10-CM

## 2024-09-30 DIAGNOSIS — M79.10 MYALGIA: ICD-10-CM

## 2024-09-30 DIAGNOSIS — R68.83 CHILLS: ICD-10-CM

## 2024-09-30 DIAGNOSIS — R09.81 NASAL CONGESTION: ICD-10-CM

## 2024-09-30 LAB
EKG ATRIAL RATE: 95 BPM
EKG DIAGNOSIS: NORMAL
EKG P AXIS: 35 DEGREES
EKG P-R INTERVAL: 158 MS
EKG Q-T INTERVAL: 350 MS
EKG QRS DURATION: 80 MS
EKG QTC CALCULATION (BAZETT): 439 MS
EKG R AXIS: -21 DEGREES
EKG T AXIS: 40 DEGREES
EKG VENTRICULAR RATE: 95 BPM
INFLUENZA A BY PCR: NOT DETECTED
INFLUENZA B BY PCR: NOT DETECTED
SARS-COV-2 RDRP RESP QL NAA+PROBE: NOT DETECTED
SPECIMEN DESCRIPTION: NORMAL

## 2024-09-30 PROCEDURE — 6370000000 HC RX 637 (ALT 250 FOR IP): Performed by: EMERGENCY MEDICINE

## 2024-09-30 PROCEDURE — 87804 INFLUENZA ASSAY W/OPTIC: CPT

## 2024-09-30 PROCEDURE — 87635 SARS-COV-2 COVID-19 AMP PRB: CPT

## 2024-09-30 PROCEDURE — 99285 EMERGENCY DEPT VISIT HI MDM: CPT

## 2024-09-30 PROCEDURE — 71046 X-RAY EXAM CHEST 2 VIEWS: CPT

## 2024-09-30 PROCEDURE — 93005 ELECTROCARDIOGRAM TRACING: CPT | Performed by: EMERGENCY MEDICINE

## 2024-09-30 PROCEDURE — 94640 AIRWAY INHALATION TREATMENT: CPT

## 2024-09-30 PROCEDURE — 93010 ELECTROCARDIOGRAM REPORT: CPT | Performed by: INTERNAL MEDICINE

## 2024-09-30 RX ORDER — ALBUTEROL SULFATE 90 UG/1
2 INHALANT RESPIRATORY (INHALATION) ONCE
Status: COMPLETED | OUTPATIENT
Start: 2024-09-30 | End: 2024-09-30

## 2024-09-30 RX ORDER — ALBUTEROL SULFATE 90 UG/1
2 INHALANT RESPIRATORY (INHALATION) 4 TIMES DAILY PRN
Qty: 18 G | Refills: 0 | Status: SHIPPED | OUTPATIENT
Start: 2024-09-30

## 2024-09-30 RX ORDER — GUAIFENESIN 600 MG/1
1200 TABLET, EXTENDED RELEASE ORAL ONCE
Status: COMPLETED | OUTPATIENT
Start: 2024-09-30 | End: 2024-09-30

## 2024-09-30 RX ORDER — FLUTICASONE PROPIONATE 50 MCG
1 SPRAY, SUSPENSION (ML) NASAL NIGHTLY PRN
Status: DISCONTINUED | OUTPATIENT
Start: 2024-09-30 | End: 2024-09-30 | Stop reason: HOSPADM

## 2024-09-30 RX ORDER — FLUTICASONE PROPIONATE 50 MCG
2 SPRAY, SUSPENSION (ML) NASAL DAILY
Qty: 16 G | Refills: 0 | Status: SHIPPED | OUTPATIENT
Start: 2024-09-30

## 2024-09-30 RX ORDER — ACETAMINOPHEN 500 MG
1000 TABLET ORAL ONCE
Status: COMPLETED | OUTPATIENT
Start: 2024-09-30 | End: 2024-09-30

## 2024-09-30 RX ORDER — ERYTHROMYCIN 5 MG/G
OINTMENT OPHTHALMIC ONCE
Status: COMPLETED | OUTPATIENT
Start: 2024-09-30 | End: 2024-09-30

## 2024-09-30 RX ORDER — ERYTHROMYCIN 5 MG/G
OINTMENT OPHTHALMIC
Qty: 3.5 G | Refills: 0 | Status: SHIPPED | OUTPATIENT
Start: 2024-09-30 | End: 2024-10-10

## 2024-09-30 RX ORDER — IBUPROFEN 400 MG/1
800 TABLET, FILM COATED ORAL ONCE
Status: DISCONTINUED | OUTPATIENT
Start: 2024-09-30 | End: 2024-09-30 | Stop reason: HOSPADM

## 2024-09-30 RX ADMIN — ACETAMINOPHEN 1000 MG: 500 TABLET ORAL at 01:19

## 2024-09-30 RX ADMIN — ALBUTEROL SULFATE 2 PUFF: 90 AEROSOL, METERED RESPIRATORY (INHALATION) at 02:44

## 2024-09-30 RX ADMIN — GUAIFENESIN 1200 MG: 600 TABLET, EXTENDED RELEASE ORAL at 02:45

## 2024-09-30 RX ADMIN — ERYTHROMYCIN: 5 OINTMENT OPHTHALMIC at 01:43

## 2024-09-30 ASSESSMENT — PAIN SCALES - GENERAL
PAINLEVEL_OUTOF10: 5
PAINLEVEL_OUTOF10: 5
PAINLEVEL_OUTOF10: 0

## 2024-09-30 ASSESSMENT — PAIN - FUNCTIONAL ASSESSMENT
PAIN_FUNCTIONAL_ASSESSMENT: NONE - DENIES PAIN
PAIN_FUNCTIONAL_ASSESSMENT: 0-10

## 2024-09-30 ASSESSMENT — LIFESTYLE VARIABLES
HOW OFTEN DO YOU HAVE A DRINK CONTAINING ALCOHOL: NEVER
HOW MANY STANDARD DRINKS CONTAINING ALCOHOL DO YOU HAVE ON A TYPICAL DAY: PATIENT DOES NOT DRINK

## 2024-09-30 ASSESSMENT — PAIN DESCRIPTION - LOCATION: LOCATION: HEAD

## 2024-09-30 NOTE — ED TRIAGE NOTES
Arrives ambulatory to ED, presents with shortness of breath and \"I'm just sick\" awoke \"sick Saturday morning\".   (+) moist non productive cough.   (+) runny nose, clear fluid.   (+) sore throat.     Recently around nephew whom was dx with covid on 09/22.    (+) smoker 0.5 ppd.     Unsure fever. (+) chilling, (+) generalized muscle aching.     AAOx4, skin w/d oral mucosa moist pink lips nailbeds pink, crf brisk, resp shallow but unlabored with symmetrical rise and fall of chest wall.

## 2024-09-30 NOTE — ED PROVIDER NOTES
Socioeconomic History    Marital status:      Spouse name: None    Number of children: None    Years of education: None    Highest education level: None   Tobacco Use    Smoking status: Former     Current packs/day: 0.00     Types: Cigarettes     Quit date: 4/10/2019     Years since quittin.4    Smokeless tobacco: Never   Vaping Use    Vaping status: Never Used   Substance and Sexual Activity    Alcohol use: No    Drug use: No       SURGICAL HISTORY  Past Surgical History:   Procedure Laterality Date    CARDIAC CATHETERIZATION      CHOLECYSTECTOMY, LAPAROSCOPIC N/A 2019    CHOLECYSTECTOMY LAPAROSCOPIC performed by Shahla Hall MD at [facility] OR     CURRENT MEDICATIONS  Previous Medications    ASPIRIN 325 MG TABLET    Take 325 mg by mouth daily    CHOLESTYRAMINE (QUESTRAN) 4 G PACKET    Take 1 packet by mouth 2 times daily     ALLERGIES  No Known Allergies    Nursing notes reviewed by myself for past medical history, family history, social history, surgical history, current medications, and allergies.    PHYSICAL EXAM  VITAL SIGNS: Triage VS:    ED Triage Vitals [24 0032]   Encounter Vitals Group      BP (!) 187/112      Systolic BP Percentile       Diastolic BP Percentile       Pulse 92      Respirations 22      Temp 98.8 °F (37.1 °C)      Temp Source Oral      SpO2 99 %      Weight       Height       Head Circumference       Peak Flow       Pain Score       Pain Loc       Pain Education       Exclude from Growth Chart      Constitutional: Well developed, Well nourished, nontoxic-appearing  HENT: Normocephalic, Atraumatic, Bilateral external ears normal, Oropharynx moist, No oral exudates, Nose normal.   Eyes: PERRL, EOMI, injection of right conjunctiva, No discharge. No scleral icterus.  Neck: Normal range of motion, No tenderness, Supple.  No meningismus  Lymphatic: No lymphadenopathy noted.   Cardiovascular: Normal heart rate, Normal rhythm, No murmurs, gallops or rubs.   Thorax

## 2024-09-30 NOTE — ED NOTES
Discharged from ED with both verbal/typed instructions given, explained in detail of diagnosis, suggested follow up and use of all given RX.   Both pt and father verbalize understanding of all given instructions.  Explained use of inhaler with spacer, use of nasal flovent and then shown application techniques for eye ointment  all questions answered.  Ambulatory from ED without difficulty, gait steady

## 2024-09-30 NOTE — ED NOTES
Resting in bed , watching TV  NAD noted.   Skin w/d oral mucosa moist pink lips nailbeds pink brisk crf, resp easy unlabored with symmetrical rise and fall of chest wall.     CM SR  .   Call light within reach, bed in low position,   Denies needs

## (undated) DEVICE — ELECTRODE ES AD CRDLSS PT RET REM POLYHESIVE

## (undated) DEVICE — 34" SINGLE PATIENT USE HOVERMATT BREATHABLE: Brand: SINGLE PATIENT USE HOVERMATT

## (undated) DEVICE — TUBING, SUCTION, 9/32" X 10', STRAIGHT: Brand: MEDLINE

## (undated) DEVICE — CHLORAPREP 26ML ORANGE

## (undated) DEVICE — CORD ES L15FT PT RET REUSE VALLEYLAB REM

## (undated) DEVICE — DRAPE SHEET ULTRAGARD: Brand: MEDLINE

## (undated) DEVICE — TROCAR ENDOSCP L100MM DIA5MM BLDELSS STBL SL THRD OPT VW

## (undated) DEVICE — TUBING INSUFFLATOR HEAT HI FLO SET PNEUMOCLEAR

## (undated) DEVICE — DRESSING TRNSPAR W2XL2.75IN FLM SHT SEMIPERMEABLE WIND

## (undated) DEVICE — PACK SURG LAP CHOLE

## (undated) DEVICE — Z INACTIVE USE 2641839 CLIP INT M L POLYMER LOK LIG HEM O LOK

## (undated) DEVICE — GLOVE ORANGE PI 7   MSG9070

## (undated) DEVICE — TISSUE RETRIEVAL SYSTEM: Brand: INZII RETRIEVAL SYSTEM

## (undated) DEVICE — Device

## (undated) DEVICE — SUTURE VCRL SZ 4-0 L18IN ABSRB UD L19MM PS-2 3/8 CIR PRIM J496H

## (undated) DEVICE — ANESTHESIA CIRCUIT ADULT-LF: Brand: MEDLINE INDUSTRIES, INC.

## (undated) DEVICE — SUTURE COAT VCRL SZ 4-0 L18IN ABSRB UD L19MM PS-2 1/2 CIR J496G

## (undated) DEVICE — 20 ML SYRINGE LUER-LOCK TIP: Brand: MONOJECT

## (undated) DEVICE — GLOVE SURG SZ 7 L12IN THK7.5MIL DK GRN LTX FREE MSG6570] MEDLINE INDUSTRIES INC]

## (undated) DEVICE — BAG SPEC REM 224ML W4XL6IN DIA10MM 1 HND GYN DISP ENDOPCH

## (undated) DEVICE — GOWN,SIRUS,POLYRNF,BRTHSLV,XLN/XL,20/CS: Brand: MEDLINE

## (undated) DEVICE — SOLUTION IV 1000ML 0.9% SOD CHL FOR IRRIG PLAS CONT

## (undated) DEVICE — TOWEL,OR,DSP,ST,BLUE,STD,6/PK,12PK/CS: Brand: MEDLINE

## (undated) DEVICE — STANDARD HYPODERMIC NEEDLE,POLYPROPYLENE HUB: Brand: MONOJECT

## (undated) DEVICE — TROCAR ENDOSCP L100MM DIA11MM STBL SL BLDELSS DISP ENDOPATH

## (undated) DEVICE — SUTURE SZ 0 27IN 5/8 CIR UR-6  TAPER PT VIOLET ABSRB VICRYL J603H

## (undated) DEVICE — SOLUTION IV IRRIG WATER 1000ML POUR BRL 2F7114

## (undated) DEVICE — STRIP SKIN CLSR W0.25XL4IN WHT SPUNBOUND FBR NYL HI ADH

## (undated) DEVICE — TROCAR ENDOSCP L100MM DIA5MM BLDELSS STBL SL OBT RADLUC